# Patient Record
Sex: FEMALE | Race: WHITE | NOT HISPANIC OR LATINO | Employment: OTHER | URBAN - METROPOLITAN AREA
[De-identification: names, ages, dates, MRNs, and addresses within clinical notes are randomized per-mention and may not be internally consistent; named-entity substitution may affect disease eponyms.]

---

## 2017-01-03 ENCOUNTER — HOSPITAL ENCOUNTER (OUTPATIENT)
Dept: RADIOLOGY | Facility: HOSPITAL | Age: 62
Discharge: HOME | End: 2017-01-03
Attending: FAMILY MEDICINE
Payer: COMMERCIAL

## 2017-01-03 ENCOUNTER — TELEPHONE (OUTPATIENT)
Dept: FAMILY MEDICINE | Facility: CLINIC | Age: 62
End: 2017-01-03

## 2017-01-03 DIAGNOSIS — N64.4 BREAST PAIN: ICD-10-CM

## 2017-01-03 DIAGNOSIS — Z12.39 BREAST CANCER SCREENING: ICD-10-CM

## 2017-01-03 PROCEDURE — 77062 BREAST TOMOSYNTHESIS BI: CPT | Mod: 26,,, | Performed by: RADIOLOGY

## 2017-01-03 PROCEDURE — 77066 DX MAMMO INCL CAD BI: CPT | Mod: TC

## 2017-01-03 PROCEDURE — 76642 ULTRASOUND BREAST LIMITED: CPT | Mod: 26,LT,, | Performed by: RADIOLOGY

## 2017-01-03 PROCEDURE — 76642 ULTRASOUND BREAST LIMITED: CPT | Mod: TC,LT

## 2017-01-03 PROCEDURE — 77066 DX MAMMO INCL CAD BI: CPT | Mod: 26,,, | Performed by: RADIOLOGY

## 2017-01-25 ENCOUNTER — TELEPHONE (OUTPATIENT)
Dept: FAMILY MEDICINE | Facility: CLINIC | Age: 62
End: 2017-01-25

## 2017-01-25 DIAGNOSIS — N64.4 BREAST PAIN, LEFT: Primary | ICD-10-CM

## 2017-01-25 NOTE — TELEPHONE ENCOUNTER
----- Message from Miroslava Sims sent at 1/25/2017 11:05 AM CST -----  Contact: / self   Pt its requesting an appointment as soon as possible , states she is having pain on her breast . Please advise

## 2017-01-25 NOTE — TELEPHONE ENCOUNTER
Returned patients call. She stated she has been having breast pain. She had diagnostic testing done after new year but the pain is severe. Please advise

## 2017-01-25 NOTE — TELEPHONE ENCOUNTER
It looks like the pain has been on her left side, so I am ordering a breast MRI of the left breast-- there are very rare breast conditions that are best seen in MRI. If this is normal, however, then she should be seen in the offic eot discuss alternative diagnosis for her pain.  Here is the order for left breast MRI thanks

## 2017-01-26 ENCOUNTER — HOSPITAL ENCOUNTER (OUTPATIENT)
Dept: RADIOLOGY | Facility: HOSPITAL | Age: 62
Discharge: HOME | End: 2017-01-26
Attending: FAMILY MEDICINE
Payer: COMMERCIAL

## 2017-01-26 DIAGNOSIS — N64.4 BREAST PAIN, LEFT: ICD-10-CM

## 2017-01-26 LAB
CREAT SERPL-MCNC: 0.9 MG/DL (ref 0.5–1.4)
SAMPLE: NORMAL

## 2017-01-26 PROCEDURE — 0159T MRI BREAST BILATERAL: CPT | Mod: TC

## 2017-01-26 PROCEDURE — 77059 MRI BREAST BILATERAL: CPT | Mod: 26,,, | Performed by: RADIOLOGY

## 2017-01-26 PROCEDURE — A9577 INJ MULTIHANCE: HCPCS | Performed by: FAMILY MEDICINE

## 2017-01-26 PROCEDURE — 0159T MRI BREAST BILATERAL: CPT | Mod: 26,,, | Performed by: RADIOLOGY

## 2017-01-26 PROCEDURE — 25500020 PHARM REV CODE 255: Performed by: FAMILY MEDICINE

## 2017-01-26 RX ADMIN — GADOBENATE DIMEGLUMINE 20 ML: 529 INJECTION, SOLUTION INTRAVENOUS at 08:01

## 2017-01-27 ENCOUNTER — TELEPHONE (OUTPATIENT)
Dept: FAMILY MEDICINE | Facility: CLINIC | Age: 62
End: 2017-01-27

## 2017-01-27 NOTE — TELEPHONE ENCOUNTER
----- Message from Chichi Handy sent at 1/27/2017  3:57 PM CST -----  Contact: Self [phone]  TEST RESULTS:   Patient would like to get test results.  Name of test (lab, mammo, etc.): MRI  Date of test: 1/26/17

## 2017-01-30 ENCOUNTER — TELEPHONE (OUTPATIENT)
Dept: FAMILY MEDICINE | Facility: CLINIC | Age: 62
End: 2017-01-30

## 2017-01-30 NOTE — TELEPHONE ENCOUNTER
Returned patients call. Reviewed lab results. Patient verbalized understanding and scheduled a follow up

## 2017-01-30 NOTE — TELEPHONE ENCOUNTER
----- Message from Ivette Mitchell sent at 1/30/2017 11:38 AM CST -----  Contact: self, [phone]  Patient called in returning your call. Please advise.

## 2017-02-01 ENCOUNTER — OFFICE VISIT (OUTPATIENT)
Dept: FAMILY MEDICINE | Facility: CLINIC | Age: 62
End: 2017-02-01
Payer: COMMERCIAL

## 2017-02-01 VITALS
WEIGHT: 197.31 LBS | SYSTOLIC BLOOD PRESSURE: 136 MMHG | HEART RATE: 71 BPM | OXYGEN SATURATION: 98 % | HEIGHT: 67 IN | DIASTOLIC BLOOD PRESSURE: 90 MMHG | BODY MASS INDEX: 30.97 KG/M2

## 2017-02-01 DIAGNOSIS — E66.9 OBESITY (BMI 30.0-34.9): ICD-10-CM

## 2017-02-01 DIAGNOSIS — N64.4 BREAST PAIN, LEFT: Primary | ICD-10-CM

## 2017-02-01 DIAGNOSIS — R29.3 POOR POSTURE: ICD-10-CM

## 2017-02-01 PROCEDURE — 3075F SYST BP GE 130 - 139MM HG: CPT | Mod: S$GLB,,, | Performed by: FAMILY MEDICINE

## 2017-02-01 PROCEDURE — 99999 PR PBB SHADOW E&M-EST. PATIENT-LVL III: CPT | Mod: PBBFAC,,, | Performed by: FAMILY MEDICINE

## 2017-02-01 PROCEDURE — 3080F DIAST BP >= 90 MM HG: CPT | Mod: S$GLB,,, | Performed by: FAMILY MEDICINE

## 2017-02-01 PROCEDURE — 99213 OFFICE O/P EST LOW 20 MIN: CPT | Mod: S$GLB,,, | Performed by: FAMILY MEDICINE

## 2017-02-01 NOTE — PROGRESS NOTES
Office Visit    Patient Name: Zenia Canela    : 1955  MRN: [id number]    Subjective:  Zenia is a 61 y.o. female who presents today for:    Breast Pain    Here today to discuss left breast pain ongoing over the last few months.  Recently normal left breast ultrasound, mammogram and MRI. She reports that that pain radiates around from the left side of her chest around and into a portion of her breast.  No lesions, rashes, redness, warmth, or nipple discharge.  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 She underwent bariatric surgery several months ago and is doing very well with her weight loss-- feels that this has affected her overall body alignment.       Past Medical History  Past Medical History   Diagnosis Date    Benign essential hypertension 2012    Venous insufficiency 2012       Past Surgical History  Past Surgical History   Procedure Laterality Date    Hernia repair       section, low transverse      Cholecystectomy      Carpal tunnel release         Family History  Family History   Problem Relation Age of Onset    Stroke Father     Heart attack Father     COPD Mother     Cataracts Mother     Heart disease      Hyperlipidemia      Cataracts Sister     Amblyopia Neg Hx     Blindness Neg Hx     Glaucoma Neg Hx     Macular degeneration Neg Hx     Retinal detachment Neg Hx     Strabismus Neg Hx        Social History  Social History     Social History    Marital status:      Spouse name: N/A    Number of children: N/A    Years of education: N/A     Occupational History     [employer]     Social History Main Topics    Smoking status: Never Smoker    Smokeless tobacco: Not on file    Alcohol use No      Comment: Moderate    Drug use: No    Sexual activity: Not on file     Other Topics Concern    Not on file     Social History Narrative    Mom is Hansa Savage. She is a .  No  "smoking.         Current Medications  Medications reviewed and updated.     Allergies   Review of patient's allergies indicates:   Allergen Reactions    Phenytoin sodium extended      Other reaction(s): jittery       Review of Systems (Pertinent positives)  Review of Systems   Musculoskeletal: Positive for back pain (radiating around to the left chest area).   Skin: Negative for color change and rash.       Visit Vitals    BP (!) 136/90    Pulse 71    Ht 5' 7" (1.702 m)    Wt 89.5 kg (197 lb 5 oz)    SpO2 98%    BMI 30.9 kg/m2       Physical Exam   Constitutional: She is oriented to person, place, and time. She appears well-developed and well-nourished. No distress.   HENT:   Head: Normocephalic and atraumatic.   Eyes: Conjunctivae are normal.   Pulmonary/Chest: Effort normal. Right breast exhibits no mass. Left breast exhibits no mass.   Genitourinary: There is breast tenderness (of left costochondral region of upper/mid ribs and along the ribs along the breast). No breast discharge.   Musculoskeletal:        Back:    Neurological: She is alert and oriented to person, place, and time.   Skin:        Psychiatric: She has a normal mood and affect.   Vitals reviewed.        Assessment/Plan:  Zenia Canela is a 61 y.o. female who presents today for :    Zenia was seen today for breast pain.    Diagnoses and all orders for this visit:    Breast pain, left  Comments:  breast imaging all negative, suspected musculoskeletal origin related to postural issues of the thoracic spine/ ribs- will consult with chiropractor    Poor posture  Comments:  will establish with chiropractor for alignment and home exercises    Obesity (BMI 30.0-34.9)  Comments:  improving following bariatric surgery            ICD-10-CM ICD-9-CM    1. Breast pain, left N64.4 611.71     breast imaging all negative, suspected musculoskeletal origin related to postural issues of the thoracic spine/ ribs- will consult with chiropractor   2. Poor " posture R29.3 781.92     will establish with chiropractor for alignment and home exercises   3. Obesity (BMI 30.0-34.9) E66.9 278.00     improving following bariatric surgery       Return for return as needed for new concerns and for annual physical.

## 2017-09-18 ENCOUNTER — TELEPHONE (OUTPATIENT)
Dept: FAMILY MEDICINE | Facility: CLINIC | Age: 62
End: 2017-09-18

## 2017-09-18 NOTE — TELEPHONE ENCOUNTER
----- Message from Efe Moore LPN sent at 9/18/2017 12:33 PM CDT -----  Contact: Self [phone]      ----- Message -----  From: Chichi Handy  Sent: 9/18/2017   8:54 AM  To: Himanshu MENA Staff    Patient is calling to see if she can see the doctor this week for a tingling and itching underarm and her hand. Please advice

## 2017-09-18 NOTE — TELEPHONE ENCOUNTER
Returned patient's call, She stated she has been having tingling and itching in the right arm. She is now scheduled for 11/25. She also stated she had a mammo done in 01/17. It was not covered by the insurance because it was done as a 3D mammo. She stated the diagnostic center here changed it because they thought they seen something from the [place] she had done. She would like to know  If the coding can be modified to include the 3D so the insurance company will cover it. Please advise.

## 2017-09-18 NOTE — TELEPHONE ENCOUNTER
----- Message from Chichi Handy sent at 9/18/2017  8:54 AM CDT -----  Contact: Self [phone]  Patient is calling to see if she can see the doctor this week for a tingling and itching underarm and her hand. Please advice

## 2017-09-19 ENCOUNTER — TELEPHONE (OUTPATIENT)
Dept: FAMILY MEDICINE | Facility: CLINIC | Age: 62
End: 2017-09-19

## 2017-09-19 NOTE — TELEPHONE ENCOUNTER
Called patient to review recommendations: reviewed her breast imaging history, and unfortunately I do not see any way I can change the code that I use to order the screening mammogram.  The imaging center did not state in their documentation why the mammogram was changed to diagnostic, so I do not have anything to use to order the more advanced imaging.  I am sorry about this, but you can also direct her to billing department to see if anything else can be done.-- Left a voicemail requesting a call back.

## 2017-09-19 NOTE — TELEPHONE ENCOUNTER
Returned patients call and reviewed recommendations she verbalized understanding. She stated she will call the billing dept.

## 2017-09-19 NOTE — TELEPHONE ENCOUNTER
----- Message from Moses Mercado sent at 9/19/2017  3:40 PM CDT -----  Contact: Self/ [phone]  Patient is returning your call.  Please call and advise.

## 2017-09-19 NOTE — TELEPHONE ENCOUNTER
I reviewed her breast imaging history, and unfortunately I do not see any way I can change the code that I use to order the screening mammogram.  The imaging center did not state in their documentation why the mammogram was changed to diagnostic, so I do not have anything to use to order the more advanced imaging.  I am sorry about this, but you can also direct her to billing department to see if anything else can be done.  Thank you

## 2017-09-25 ENCOUNTER — OFFICE VISIT (OUTPATIENT)
Dept: FAMILY MEDICINE | Facility: CLINIC | Age: 62
End: 2017-09-25
Payer: COMMERCIAL

## 2017-09-25 VITALS
SYSTOLIC BLOOD PRESSURE: 128 MMHG | WEIGHT: 175.69 LBS | HEART RATE: 64 BPM | OXYGEN SATURATION: 99 % | BODY MASS INDEX: 27.57 KG/M2 | DIASTOLIC BLOOD PRESSURE: 75 MMHG | HEIGHT: 67 IN

## 2017-09-25 DIAGNOSIS — M77.11 RIGHT LATERAL EPICONDYLITIS: Primary | ICD-10-CM

## 2017-09-25 DIAGNOSIS — Z98.84 HISTORY OF BARIATRIC SURGERY: ICD-10-CM

## 2017-09-25 DIAGNOSIS — R68.89 FORGETFULNESS: ICD-10-CM

## 2017-09-25 DIAGNOSIS — Z12.31 ENCOUNTER FOR SCREENING MAMMOGRAM FOR BREAST CANCER: ICD-10-CM

## 2017-09-25 DIAGNOSIS — N95.1 VAGINAL DRYNESS, MENOPAUSAL: ICD-10-CM

## 2017-09-25 PROCEDURE — 3078F DIAST BP <80 MM HG: CPT | Mod: S$GLB,,, | Performed by: FAMILY MEDICINE

## 2017-09-25 PROCEDURE — 3008F BODY MASS INDEX DOCD: CPT | Mod: S$GLB,,, | Performed by: FAMILY MEDICINE

## 2017-09-25 PROCEDURE — 99214 OFFICE O/P EST MOD 30 MIN: CPT | Mod: S$GLB,,, | Performed by: FAMILY MEDICINE

## 2017-09-25 PROCEDURE — 3074F SYST BP LT 130 MM HG: CPT | Mod: S$GLB,,, | Performed by: FAMILY MEDICINE

## 2017-09-25 PROCEDURE — 99999 PR PBB SHADOW E&M-EST. PATIENT-LVL III: CPT | Mod: PBBFAC,,, | Performed by: FAMILY MEDICINE

## 2017-09-25 RX ORDER — CHOLECALCIFEROL (VITAMIN D3) 25 MCG
1000 TABLET ORAL DAILY
COMMUNITY
End: 2021-07-12 | Stop reason: ALTCHOICE

## 2017-09-25 RX ORDER — HYDROCHLOROTHIAZIDE 25 MG/1
25 TABLET ORAL DAILY
Refills: 3 | COMMUNITY
Start: 2017-08-12 | End: 2018-03-27 | Stop reason: SDUPTHER

## 2017-09-25 NOTE — PROGRESS NOTES
Office Visit    Patient Name: Zenia Canela    : 1955  MRN: [id number]    Subjective:  Zenia is a 62 y.o. female who presents today for:    Tingling (itching, tingling in right arm)    63 yo female with history of postural related musculoskeletal issues here today with itching and tingling of the right arm over the last 3 weeks-- tingling starting along the right outer elbow and extends into the forearm but not into the hand. No rash or skin abnormality noted. No loss of  strength. Upon further questioning she reports that over the last month she started a new exercise regimen that does involve several repetitive movements of the elbow and that the time course is starting with new exercises does seem to correlate with the new pain and tingling in her elbow and forearm.    Of note, she did have bariatric surgery in 2016 outside of the [facility] and she is continuing to lose weight consistently.  She feels well.  On review of systems she reports some mildly increased forgetfulness which she is not overly concerned about just has some general questions about when she needs to worry about Alzheimer's given her family history.  Finally, she requests a refill of her Premarin which she is stable on for treatment of vaginal dryness.    2017 normal breast MRI-- no malignancy    Past Medical History  Past Medical History:   Diagnosis Date    Benign essential hypertension 2012    Venous insufficiency 2012       Past Surgical History  Past Surgical History:   Procedure Laterality Date    CARPAL TUNNEL RELEASE       SECTION, LOW TRANSVERSE      CHOLECYSTECTOMY      HERNIA REPAIR         Family History  Family History   Problem Relation Age of Onset    Stroke Father     Heart attack Father     COPD Mother     Cataracts Mother     Heart disease      Hyperlipidemia      Cataracts Sister     Amblyopia Neg Hx     Blindness Neg Hx     Glaucoma Neg Hx     Macular  "degeneration Neg Hx     Retinal detachment Neg Hx     Strabismus Neg Hx        Social History  Social History     Social History    Marital status:      Spouse name: N/A    Number of children: N/A    Years of education: N/A     Occupational History     [employer]     Social History Main Topics    Smoking status: Never Smoker    Smokeless tobacco: Never Used    Alcohol use No      Comment: Moderate    Drug use: No    Sexual activity: Not on file     Other Topics Concern    Not on file     Social History Narrative    Mom is Hnasa Savage. She is a .  No smoking.         Current Medications  Medications reviewed and updated.     Allergies   Review of patient's allergies indicates:   Allergen Reactions    Phenytoin sodium extended      Other reaction(s): jittery       Review of Systems (Pertinent positives)  Review of Systems   Constitutional: Negative for unexpected weight change.   Genitourinary: Vaginal pain: vaginal dryness.   Skin: Negative for rash.   Neurological: Negative for weakness and numbness (positive tingling).   Psychiatric/Behavioral:        Increased forgetfulness         /75   Pulse 64   Ht 5' 7" (1.702 m)   Wt 79.7 kg (175 lb 11.3 oz)   SpO2 99%   BMI 27.52 kg/m²     Physical Exam   Constitutional: She is oriented to person, place, and time. She appears well-developed and well-nourished. No distress.   HENT:   Head: Normocephalic and atraumatic.   Eyes: Conjunctivae are normal.   Pulmonary/Chest: Effort normal.   Musculoskeletal: She exhibits no edema.        Right elbow: Tenderness found. Lateral epicondyle tenderness noted.   Neurological: She is alert and oriented to person, place, and time.   Skin: Skin is warm and dry.   Psychiatric: She has a normal mood and affect.   Vitals reviewed.        Assessment/Plan:  Zenia Canela is a 62 y.o. female who presents today for :    Zenia was seen today for tingling.    Diagnoses and all " orders for this visit:    Right lateral epicondylitis  Comments:  tennis elbow forearm strap for 6 weeks with activity, advil as needed for pain    Forgetfulness  Comments:  if having a negative effect on functioning then schedule appointment for memory testing    Encounter for screening mammogram for breast cancer  -     Mammo Digital Screening Bilat with CAD; Future    Vaginal dryness, menopausal  Comments:  stable on premarin  Orders:  -     conjugated estrogens (PREMARIN) vaginal cream; Place 0.5 g vaginally every evening. Decrease to 3-5x week once dryness improves    History of bariatric surgery  Comments:  Doing well, losing weight consistently, has labs and follow-up with bariatric surgeon arranged.            ICD-10-CM ICD-9-CM    1. Right lateral epicondylitis M77.11 726.32     tennis elbow forearm strap for 6 weeks with activity, advil as needed for pain   2. Forgetfulness R68.89 780.99     if having a negative effect on functioning then schedule appointment for memory testing   3. Encounter for screening mammogram for breast cancer Z12.31 V76.12 Mammo Digital Screening Bilat with CAD   4. Vaginal dryness, menopausal N95.1 627.2 conjugated estrogens (PREMARIN) vaginal cream    stable on premarin   5. History of bariatric surgery Z98.84 V45.86     Doing well, losing weight consistently, has labs and follow-up with bariatric surgeon arranged.       Patient Instructions   Zenia was seen today for tingling.    Diagnoses and all orders for this visit:    Right lateral epicondylitis  Comments:  tennis elbow forearm strap for 6 weeks with activity, advil as needed for pain    Forgetfulness  Comments:  if having a negative effect on functioning then schedule appointment for memory testing    Encounter for screening mammogram for breast cancer  -     Mammo Digital Screening Bilat with CAD; Future    Vaginal dryness, menopausal  -     conjugated estrogens (PREMARIN) vaginal cream; Place 0.5 g vaginally every  evening. Decrease to 3-5x week once dryness improves          Return for return august 2018 for annual exam, sooner if concerns.

## 2017-09-25 NOTE — PATIENT INSTRUCTIONS
Zenia was seen today for tingling.    Diagnoses and all orders for this visit:    Right lateral epicondylitis  Comments:  tennis elbow forearm strap for 6 weeks with activity, advil as needed for pain    Forgetfulness  Comments:  if having a negative effect on functioning then schedule appointment for memory testing    Encounter for screening mammogram for breast cancer  -     Mammo Digital Screening Bilat with CAD; Future    Vaginal dryness, menopausal  -     conjugated estrogens (PREMARIN) vaginal cream; Place 0.5 g vaginally every evening. Decrease to 3-5x week once dryness improves

## 2017-11-16 ENCOUNTER — TELEPHONE (OUTPATIENT)
Dept: FAMILY MEDICINE | Facility: CLINIC | Age: 62
End: 2017-11-16

## 2017-11-16 NOTE — TELEPHONE ENCOUNTER
----- Message from Christina Sykes sent at 11/15/2017 12:48 PM CST -----  Contact: [phone]/self  Pt need to be seen before 11/27/2017 for a medical clearance.  Please call and advise

## 2017-11-20 ENCOUNTER — TELEPHONE (OUTPATIENT)
Dept: FAMILY MEDICINE | Facility: CLINIC | Age: 62
End: 2017-11-20

## 2017-11-20 NOTE — TELEPHONE ENCOUNTER
Spoke with patient. She was originally scheduled on 11/27 for pre op clearance and had to reschedule due to incorrect scheduling. She is now scheduled for a sooner date.

## 2017-11-28 ENCOUNTER — OFFICE VISIT (OUTPATIENT)
Dept: FAMILY MEDICINE | Facility: CLINIC | Age: 62
End: 2017-11-28
Payer: COMMERCIAL

## 2017-11-28 ENCOUNTER — HOSPITAL ENCOUNTER (OUTPATIENT)
Dept: CARDIOLOGY | Facility: HOSPITAL | Age: 62
Discharge: HOME | End: 2017-11-28
Attending: FAMILY MEDICINE
Payer: COMMERCIAL

## 2017-11-28 VITALS
HEART RATE: 65 BPM | DIASTOLIC BLOOD PRESSURE: 85 MMHG | BODY MASS INDEX: 27.57 KG/M2 | WEIGHT: 175.69 LBS | HEIGHT: 67 IN | SYSTOLIC BLOOD PRESSURE: 135 MMHG | OXYGEN SATURATION: 98 %

## 2017-11-28 DIAGNOSIS — I10 BENIGN ESSENTIAL HYPERTENSION: ICD-10-CM

## 2017-11-28 DIAGNOSIS — Z98.84 HISTORY OF BARIATRIC SURGERY: ICD-10-CM

## 2017-11-28 DIAGNOSIS — E55.9 VITAMIN D DEFICIENCY: ICD-10-CM

## 2017-11-28 DIAGNOSIS — M25.571 CHRONIC PAIN OF RIGHT ANKLE: Primary | ICD-10-CM

## 2017-11-28 DIAGNOSIS — M25.371 ANKLE INSTABILITY, RIGHT: ICD-10-CM

## 2017-11-28 DIAGNOSIS — Z01.810 PREOP CARDIOVASCULAR EXAM: ICD-10-CM

## 2017-11-28 DIAGNOSIS — M70.51 PES ANSERINUS BURSITIS OF RIGHT KNEE: ICD-10-CM

## 2017-11-28 DIAGNOSIS — E66.3 OVERWEIGHT (BMI 25.0-29.9): ICD-10-CM

## 2017-11-28 DIAGNOSIS — Z79.1 LONG TERM (CURRENT) USE OF NON-STEROIDAL ANTI-INFLAMMATORIES (NSAID): ICD-10-CM

## 2017-11-28 DIAGNOSIS — G89.29 CHRONIC PAIN OF RIGHT ANKLE: Primary | ICD-10-CM

## 2017-11-28 PROCEDURE — 93005 ELECTROCARDIOGRAM TRACING: CPT

## 2017-11-28 PROCEDURE — 99214 OFFICE O/P EST MOD 30 MIN: CPT | Mod: S$GLB,,, | Performed by: FAMILY MEDICINE

## 2017-11-28 PROCEDURE — 99999 PR PBB SHADOW E&M-EST. PATIENT-LVL III: CPT | Mod: PBBFAC,,, | Performed by: FAMILY MEDICINE

## 2017-11-28 PROCEDURE — 93010 ELECTROCARDIOGRAM REPORT: CPT | Mod: ,,, | Performed by: INTERNAL MEDICINE

## 2017-11-28 RX ORDER — HYDROCODONE BITARTRATE AND ACETAMINOPHEN 5; 325 MG/1; MG/1
1 TABLET ORAL EVERY 6 HOURS PRN
Qty: 30 TABLET | Refills: 0 | Status: SHIPPED | OUTPATIENT
Start: 2017-11-28 | End: 2018-03-27

## 2017-11-28 NOTE — PROGRESS NOTES
Office Visit    Patient Name: Zenia Canela    : 1955  MRN: [id number]    Subjective:  Zenia is a 62 y.o. female who presents today for:    Pre-op Exam (right ankle surgery) and Knee Pain (right knee)    Here for pre-op clearance for right ankle surgery by Ortho Dr Cain with [facility], scheduled on 2017.  Surgery is scheduled secondary to chronic pain and instability of the right ankle.     She is overall very healthy and on just HCTZ 25 mg daily for mild HTN and leg swelling. Had gastric sleeve 2016 and no complications-- no issues with anesthesia, etc.     Feeligng healthy and well with no symptoms of infection- no fevers, chills, nausea, vomiting, dysuria, diarrhea, skin rash, sore throat    Exercise tolerance is excellent and she can do an hour of brisk cardiovascular thighs without any cardiopulmonary symptoms.  He does not have chest pain, shortness of breath, dizziness or lightheadedness, palpitations    She does intermittently take NSAIDs such as Aleve for the pain in her right ankle and right knee.  She is aware of the need to stop this 7-10 days prior to her surgery along with any aspirin containing products.    Past Medical History  Past Medical History:   Diagnosis Date    Benign essential hypertension 2012    Venous insufficiency 2012       Past Surgical History  Past Surgical History:   Procedure Laterality Date    CARPAL TUNNEL RELEASE       SECTION, LOW TRANSVERSE      CHOLECYSTECTOMY      HERNIA REPAIR         Family History  Family History   Problem Relation Age of Onset    Stroke Father     Heart attack Father     COPD Mother     Cataracts Mother     Heart disease      Hyperlipidemia      Cataracts Sister     Amblyopia Neg Hx     Blindness Neg Hx     Glaucoma Neg Hx     Macular degeneration Neg Hx     Retinal detachment Neg Hx     Strabismus Neg Hx        Social History  Social History     Social History    Marital  "status:      Spouse name: N/A    Number of children: N/A    Years of education: N/A     Occupational History     [employer]     Social History Main Topics    Smoking status: Never Smoker    Smokeless tobacco: Never Used    Alcohol use No      Comment: Moderate    Drug use: No    Sexual activity: Not on file     Other Topics Concern    Not on file     Social History Narrative    Mom is Hansa Savage. She is a .  No smoking.         Current Medications  Medications reviewed and updated.     Allergies   Review of patient's allergies indicates:   Allergen Reactions    Phenytoin sodium extended      Other reaction(s): jittery       Review of Systems (Pertinent positives)  Review of Systems   Constitutional: Negative for chills, fever and unexpected weight change.   HENT: Negative for sore throat.    Eyes: Negative for visual disturbance.   Respiratory: Negative for chest tightness and shortness of breath.    Cardiovascular: Negative for chest pain, palpitations and leg swelling.   Gastrointestinal: Negative for diarrhea, nausea and vomiting.   Genitourinary: Negative for dysuria.   Musculoskeletal: Positive for arthralgias.   Skin: Negative for rash.   Neurological: Negative for dizziness, light-headedness and headaches.       /85 (BP Location: Right arm, Patient Position: Sitting)   Pulse 65   Ht 5' 7" (1.702 m)   Wt 79.7 kg (175 lb 11.3 oz)   SpO2 98%   BMI 27.52 kg/m²     Physical Exam   Constitutional: She is oriented to person, place, and time. She appears well-developed and well-nourished. No distress.   HENT:   Head: Normocephalic and atraumatic.   Mouth/Throat: Oropharynx is clear and moist. No oropharyngeal exudate.   Eyes: Conjunctivae are normal.   Neck: Normal range of motion. Neck supple.   Cardiovascular: Normal rate and regular rhythm.    Pulmonary/Chest: Effort normal and breath sounds normal.   Musculoskeletal: She exhibits no edema.        " Right knee: She exhibits no swelling and no effusion. Tenderness (over anserine bursa) found. Medial joint line tenderness noted.   Lymphadenopathy:     She has no cervical adenopathy.   Neurological: She is alert and oriented to person, place, and time.   Skin: Skin is warm and dry.   Psychiatric: She has a normal mood and affect.   Vitals reviewed.        Assessment/Plan:  Zenia Canela is a 62 y.o. female who presents today for :    Zenia was seen today for pre-op exam and knee pain.    Diagnoses and all orders for this visit:    Chronic pain of right ankle  Comments:  has surgery scheduled as per [facility]  Orders:  -     hydrocodone-acetaminophen 5-325mg (NORCO) 5-325 mg per tablet; Take 1 tablet by mouth every 6 (six) hours as needed for Pain.    Ankle instability, right  -     hydrocodone-acetaminophen 5-325mg (NORCO) 5-325 mg per tablet; Take 1 tablet by mouth every 6 (six) hours as needed for Pain.    Preop cardiovascular exam  Comments:  clear for surgery pending results of EKG and blood work  Orders:  -     Comprehensive metabolic panel; Future  -     CBC auto differential; Future  -     EKG 12-lead; Future    Benign essential hypertension  -     Comprehensive metabolic panel; Future  -     CBC auto differential; Future  -     EKG 12-lead; Future    Overweight (BMI 25.0-29.9)    History of bariatric surgery    Vitamin D deficiency  Comments:  [facility]    Pes anserinus bursitis of right knee  Comments:  will try ice, stretches, brace as needed  Orders:  -     hydrocodone-acetaminophen 5-325mg (NORCO) 5-325 mg per tablet; Take 1 tablet by mouth every 6 (six) hours as needed for Pain.    Long term (current) use of non-steroidal anti-inflammatories (nsaid)  Comments:  reminded to stop any aspirin containing products and NSAIDs 7-10 days prior to her surgery            ICD-10-CM ICD-9-CM    1. Chronic pain of right ankle M25.571 719.47 hydrocodone-acetaminophen 5-325mg (NORCO)  5-325 mg per tablet    G89.29 338.29     has surgery scheduled as per HPI   2. Ankle instability, right M25.371 718.87 hydrocodone-acetaminophen 5-325mg (NORCO) 5-325 mg per tablet   3. Preop cardiovascular exam Z01.810 V72.81 Comprehensive metabolic panel      CBC auto differential      EKG 12-lead    clear for surgery pending results of EKG and blood work   4. Benign essential hypertension I10 401.1 Comprehensive metabolic panel      CBC auto differential      EKG 12-lead   5. Overweight (BMI 25.0-29.9) E66.3 278.02    6. History of bariatric surgery Z98.84 V45.86    7. Vitamin D deficiency E55.9 268.9     [facility] and [facility]   8. Pes anserinus bursitis of right knee M70.51 726.61 hydrocodone-acetaminophen 5-325mg (NORCO) 5-325 mg per tablet    will try ice, stretches, brace as needed   9. Long term (current) use of non-steroidal anti-inflammatories (nsaid) Z79.1 V58.64     reminded to stop any aspirin containing products and NSAIDs 7-10 days prior to her surgery       Patient Instructions   Will fax [facility] clearance to [facility] pending results of EKG and blood work.        Return for return as needed for new concerns.

## 2017-11-30 ENCOUNTER — TELEPHONE (OUTPATIENT)
Dept: FAMILY MEDICINE | Facility: CLINIC | Age: 62
End: 2017-11-30

## 2018-02-27 ENCOUNTER — HOSPITAL ENCOUNTER (OUTPATIENT)
Dept: RADIOLOGY | Facility: HOSPITAL | Age: 63
Discharge: HOME | End: 2018-02-27
Attending: FAMILY MEDICINE
Payer: COMMERCIAL

## 2018-02-27 DIAGNOSIS — Z12.31 ENCOUNTER FOR SCREENING MAMMOGRAM FOR BREAST CANCER: ICD-10-CM

## 2018-02-27 PROCEDURE — 77063 BREAST TOMOSYNTHESIS BI: CPT | Mod: 26,,, | Performed by: RADIOLOGY

## 2018-02-27 PROCEDURE — 77067 SCR MAMMO BI INCL CAD: CPT | Mod: 26,,, | Performed by: RADIOLOGY

## 2018-02-27 PROCEDURE — 77067 SCR MAMMO BI INCL CAD: CPT | Mod: TC

## 2018-03-26 ENCOUNTER — TELEPHONE (OUTPATIENT)
Dept: FAMILY MEDICINE | Facility: CLINIC | Age: 63
End: 2018-03-26

## 2018-03-26 NOTE — TELEPHONE ENCOUNTER
----- Message from Janett Jarrell sent at 3/23/2018  2:41 PM CDT -----  Contact: [phone]  Patient would like to be seen sooner than the next available appointment. She is experiencing dizziness and it's getting worse. Please advise.

## 2018-03-26 NOTE — TELEPHONE ENCOUNTER
Returned patient's call, She said she has been experiencing dizziness and she would like to be seen this week. She is now scheduled for tomorrow.

## 2018-03-27 ENCOUNTER — OFFICE VISIT (OUTPATIENT)
Dept: FAMILY MEDICINE | Facility: CLINIC | Age: 63
End: 2018-03-27
Payer: COMMERCIAL

## 2018-03-27 VITALS
HEIGHT: 68 IN | DIASTOLIC BLOOD PRESSURE: 78 MMHG | WEIGHT: 177.5 LBS | SYSTOLIC BLOOD PRESSURE: 136 MMHG | TEMPERATURE: 99 F | OXYGEN SATURATION: 98 % | HEART RATE: 66 BPM | BODY MASS INDEX: 26.9 KG/M2

## 2018-03-27 DIAGNOSIS — E66.3 OVERWEIGHT (BMI 25.0-29.9): ICD-10-CM

## 2018-03-27 DIAGNOSIS — I10 BENIGN ESSENTIAL HYPERTENSION: ICD-10-CM

## 2018-03-27 DIAGNOSIS — R42 DIZZINESS: Primary | ICD-10-CM

## 2018-03-27 PROCEDURE — 3078F DIAST BP <80 MM HG: CPT | Mod: CPTII,S$GLB,, | Performed by: FAMILY MEDICINE

## 2018-03-27 PROCEDURE — 99214 OFFICE O/P EST MOD 30 MIN: CPT | Mod: S$GLB,,, | Performed by: FAMILY MEDICINE

## 2018-03-27 PROCEDURE — 99999 PR PBB SHADOW E&M-EST. PATIENT-LVL III: CPT | Mod: PBBFAC,,, | Performed by: FAMILY MEDICINE

## 2018-03-27 PROCEDURE — 3075F SYST BP GE 130 - 139MM HG: CPT | Mod: CPTII,S$GLB,, | Performed by: FAMILY MEDICINE

## 2018-03-27 RX ORDER — HYDROCHLOROTHIAZIDE 25 MG/1
25 TABLET ORAL DAILY
Qty: 90 TABLET | Refills: 3 | Status: SHIPPED | OUTPATIENT
Start: 2018-03-27 | End: 2019-06-04 | Stop reason: SDUPTHER

## 2018-03-27 NOTE — PROGRESS NOTES
" Office Visit    Patient Name: Zenia Canela    : 1955  MRN: [id number]    Subjective:  Zenia is a 62 y.o. female who presents today for:    Dizziness (refill hydrochlorothiazide, dizziness ongoing for 1 week it stopped yesterday)    61 yo with PMH pertinent for HTN, low vitamin D and s/p bariatric surgery with significant weight loss here today to discuss dizziness.    starting about 1 week ago when she woke up in the morning she felt woozy, and like she was on a "cruise ship." no overt vertigo/ spinning.  One episode of ear ringing-- very brief and hearing has been normal.  She reports no lightheadedness, no fevers chills nausea or vomiting. no associated URI symptoms.  No chest pain, palpitations, increased leg swelling.  Blood pressures have been stable on hydrochlorothiazide.    EKG 2017 for pre-op clearance for foot surgery was normal. Labs including CBC/ CMP unremarkable at that time      Past Medical History  Past Medical History:   Diagnosis Date    Benign essential hypertension 2012    Venous insufficiency 2012       Past Surgical History  Past Surgical History:   Procedure Laterality Date    CARPAL TUNNEL RELEASE       SECTION, LOW TRANSVERSE      CHOLECYSTECTOMY      HERNIA REPAIR         Family History  Family History   Problem Relation Age of Onset    Stroke Father     Heart attack Father     COPD Mother     Cataracts Mother     Heart disease      Hyperlipidemia      Cataracts Sister     Amblyopia Neg Hx     Blindness Neg Hx     Glaucoma Neg Hx     Macular degeneration Neg Hx     Retinal detachment Neg Hx     Strabismus Neg Hx        Social History  Social History     Social History    Marital status:      Spouse name: N/A    Number of children: N/A    Years of education: N/A     Occupational History      [employer]     Social History Main Topics    Smoking status: Never Smoker    Smokeless tobacco: Never Used    Alcohol use " "No      Comment: Moderate    Drug use: No    Sexual activity: Not on file     Other Topics Concern    Not on file     Social History Narrative    Mom is Hansa Savage. She is a .  No smoking.         Current Medications  Medications reviewed and updated.     Allergies   Review of patient's allergies indicates:   Allergen Reactions    Phenytoin sodium extended      Other reaction(s): jittery       Review of Systems (Pertinent positives)  Review of Systems   Constitutional: Negative for chills, fever and unexpected weight change.   HENT: Negative for ear pain, sinus pain and sinus pressure.    Eyes: Negative for visual disturbance.   Respiratory: Negative for chest tightness and shortness of breath.    Cardiovascular: Negative for chest pain, palpitations and leg swelling.   Gastrointestinal: Negative for nausea and vomiting.   Neurological: Negative for dizziness (now resolved), light-headedness and headaches.       /78   Pulse 66   Temp 98.9 °F (37.2 °C)   Ht 5' 7.5" (1.715 m)   Wt 80.5 kg (177 lb 7.5 oz)   LMP  (LMP Unknown)   SpO2 98%   BMI 27.39 kg/m²     Physical Exam   Constitutional: She is oriented to person, place, and time. She appears well-developed and well-nourished. No distress.   HENT:   Head: Normocephalic and atraumatic.   Right Ear: Tympanic membrane normal.   Left Ear: Tympanic membrane is retracted. A middle ear effusion (small) is present.   Nose: No mucosal edema or rhinorrhea.   Mouth/Throat: Posterior oropharyngeal erythema (mild) present. No oropharyngeal exudate or posterior oropharyngeal edema.   Eyes: Conjunctivae are normal.   Cardiovascular: Normal rate and regular rhythm.    Pulmonary/Chest: Effort normal and breath sounds normal.   Musculoskeletal: She exhibits no edema.   Neurological: She is alert and oriented to person, place, and time. Coordination normal.   Negative Klarissa-Hallpike   Skin: Skin is warm and dry.   Psychiatric: She has a normal mood " and affect.   Vitals reviewed.        Assessment/Plan:  Zenia Canela is a 62 y.o. female who presents today for :    Zenia was seen today for dizziness.    Diagnoses and all orders for this visit:    Dizziness  Comments:  suspect viral labrynthitis and symptoms now resolved.  no associated symptoms/ indication for labs or imaging/ further work up. will monitor    Benign essential hypertension  Comments:  controlled  Orders:  -     hydroCHLOROthiazide (HYDRODIURIL) 25 MG tablet; Take 1 tablet (25 mg total) by mouth once daily.    Overweight (BMI 25.0-29.9)            ICD-10-CM ICD-9-CM    1. Dizziness R42 780.4     suspect viral labrynthitis and symptoms now resolved.  no associated symptoms/ indication for labs or imaging/ further work up. will monitor   2. Benign essential hypertension I10 401.1 hydroCHLOROthiazide (HYDRODIURIL) 25 MG tablet    controlled   3. Overweight (BMI 25.0-29.9) E66.3 278.02        There are no Patient Instructions on file for this visit.      Follow-up for return as needed for new concerns and for annual exam later this year.

## 2018-10-26 ENCOUNTER — TELEPHONE (OUTPATIENT)
Dept: FAMILY MEDICINE | Facility: CLINIC | Age: 63
End: 2018-10-26

## 2018-10-26 NOTE — TELEPHONE ENCOUNTER
Called patient to schedule appointment for Tdap injection.  Scheduled patient for this upcoming Monday.  Patient verbalized understanding.

## 2018-10-29 ENCOUNTER — TELEPHONE (OUTPATIENT)
Dept: FAMILY MEDICINE | Facility: CLINIC | Age: 63
End: 2018-10-29

## 2018-10-29 ENCOUNTER — CLINICAL SUPPORT (OUTPATIENT)
Dept: FAMILY MEDICINE | Facility: CLINIC | Age: 63
End: 2018-10-29
Payer: COMMERCIAL

## 2018-10-29 PROCEDURE — 90686 IIV4 VACC NO PRSV 0.5 ML IM: CPT | Mod: S$GLB,,, | Performed by: FAMILY MEDICINE

## 2018-10-29 PROCEDURE — 90715 TDAP VACCINE 7 YRS/> IM: CPT | Mod: S$GLB,,, | Performed by: FAMILY MEDICINE

## 2018-10-29 PROCEDURE — 90471 IMMUNIZATION ADMIN: CPT | Mod: S$GLB,,, | Performed by: FAMILY MEDICINE

## 2018-10-29 PROCEDURE — 90472 IMMUNIZATION ADMIN EACH ADD: CPT | Mod: S$GLB,,, | Performed by: FAMILY MEDICINE

## 2018-10-29 NOTE — TELEPHONE ENCOUNTER
Patient came in office today for nurses visit.  States that she had left hand pain that has been occurring over the past 3 weeks persistently.  Patient states that she feels a small hard nodule forming in palm of hand near ring finger where pain is located.  Requesting xray.  Please advise.

## 2018-10-30 NOTE — TELEPHONE ENCOUNTER
In some ways this sounds like it may be a tendon nodule, like what we seen in trigger finger. In any case, several types of nodular lesions don't show up on xray, so please have her schedule an appoint with me so that I can evaluate her condition and determine if xray is appropriate thanks

## 2018-10-31 ENCOUNTER — TELEPHONE (OUTPATIENT)
Dept: FAMILY MEDICINE | Facility: CLINIC | Age: 63
End: 2018-10-31

## 2018-10-31 NOTE — TELEPHONE ENCOUNTER
Returned patient's call to notify that she will need to come in for office visit.  Patient verbalized understanding.

## 2019-06-03 RX ORDER — HYDROCHLOROTHIAZIDE 25 MG/1
25 TABLET ORAL DAILY
Qty: 90 TABLET | Refills: 4 | Status: CANCELLED | OUTPATIENT
Start: 2019-06-03

## 2019-06-03 NOTE — PROGRESS NOTES
Office Visit    Patient Name: Zenia Canela    : 1955  MRN: [id number]    Subjective:  Zenia is a 63 y.o. female who presents today for:    Annual Exam (shingles, sore on nose)    Zenia Canela presents today for annual wellness exam and monitoring of chronic conditions that include  HTN controlled on HCTZ 25 mg daily, ISABELLA stable with use of premarin vaginal & s/p wt loss, overweight BMI, vit D deficiency.     She is is postmenopausal. Most recent PAP WNL/HPV (-) 10/14/15     They have been feeling overall well. Preparing for right knee replacement- [facility].     General lifestyle habits are as follows:  Diet is described as healthy-- good variety, exercise is described as decreased due to right knee pain (planning to have knee replacement), sleep is described as fair. Weight is up about 10 lbs in the last year but still down 60 from prior to her gastric bypass.     Immunizations: TDaP 10/29/18 & yearly influenza UTD, SHINGRIX- due and advised through pharmacy, pneumonia vaccines after age 65    Screening Tests: DEXA 12 WNL & repeat at age 64, mammogram 17- repeat 1 yr and ordered, Colonoscopy 13- repeat 10 yrs (hyperplastic polyps), PAP/HPV WNL/(-) 10/14/15- repeat 5 yrs, HEP C (-) 2004    Eye/Dental: Eye UTD- no concerns , Dental UTD- appointment today            Past Medical History  Past Medical History:   Diagnosis Date    Benign essential hypertension 2012    Venous insufficiency 2012       Past Surgical History  Past Surgical History:   Procedure Laterality Date    BARIATRIC SURGERY      gastric sleeve    CARPAL TUNNEL RELEASE       SECTION, LOW TRANSVERSE      CHOLECYSTECTOMY      HERNIA REPAIR         Family History  Family History   Problem Relation Age of Onset    Stroke Father     Heart attack Father     COPD Mother     Cataracts Mother     Heart disease Unknown     Hyperlipidemia Unknown     Cataracts Sister     Amblyopia  Neg Hx     Blindness Neg Hx     Glaucoma Neg Hx     Macular degeneration Neg Hx     Retinal detachment Neg Hx     Strabismus Neg Hx        Social History  Social History     Socioeconomic History    Marital status:      Spouse name: Not on file    Number of children: Not on file    Years of education: Not on file    Highest education level: Not on file   Occupational History     Employer: [employer]   Social Needs    Financial resource strain: Not on file    Food insecurity:     Worry: Not on file     Inability: Not on file    Transportation needs:     Medical: Not on file     Non-medical: Not on file   Tobacco Use    Smoking status: Never Smoker    Smokeless tobacco: Never Used   Substance and Sexual Activity    Alcohol use: No     Comment: Moderate    Drug use: No    Sexual activity: Not on file   Lifestyle    Physical activity:     Days per week: Not on file     Minutes per session: Not on file    Stress: Not on file   Relationships    Social connections:     Talks on phone: Not on file     Gets together: Not on file     Attends Taoism service: Not on file     Active member of club or organization: Not on file     Attends meetings of clubs or organizations: Not on file     Relationship status: Not on file   Other Topics Concern    Not on file   Social History Narrative    Mom is Hansa Savage. She is a .  No smoking.         Current Medications  Medications reviewed and updated.     Allergies   Review of patient's allergies indicates:   Allergen Reactions    Phenytoin sodium extended      Other reaction(s): jittery       Review of Systems (Pertinent positives)  Review of Systems   Constitutional: Positive for unexpected weight change. Negative for chills and fever.   HENT: Negative for dental problem.    Eyes: Negative for visual disturbance.   Respiratory: Negative for chest tightness and shortness of breath.    Cardiovascular: Negative for chest  "pain and leg swelling.   Gastrointestinal: Negative for constipation, diarrhea, nausea and vomiting.   Genitourinary: Negative for dysuria and hematuria.   Musculoskeletal: Positive for arthralgias.   Skin: Negative for rash.   Allergic/Immunologic: Negative for environmental allergies.   Neurological: Negative for dizziness and light-headedness.   Hematological: Bruises/bleeds easily.       /86   Pulse 61   Temp 98 °F (36.7 °C)   Ht 5' 7" (1.702 m)   Wt 83.7 kg (184 lb 8.4 oz)   LMP  (LMP Unknown)   SpO2 99%   BMI 28.90 kg/m²     Physical Exam   Constitutional: She is oriented to person, place, and time. She appears well-developed and well-nourished. No distress.   HENT:   Head: Normocephalic and atraumatic.   Right Ear: Ear canal normal. Tympanic membrane is not erythematous and not bulging.   Left Ear: Ear canal normal. Tympanic membrane is not erythematous and not bulging.   Mouth/Throat: No oropharyngeal exudate.   Eyes: Conjunctivae are normal.   Neck: Carotid bruit is not present. No thyroid mass and no thyromegaly present.   Cardiovascular: Normal rate, regular rhythm and normal heart sounds.   No murmur heard.  Pulses:       Dorsalis pedis pulses are 2+ on the right side, and 2+ on the left side.   Pulmonary/Chest: Effort normal and breath sounds normal. No respiratory distress.   Abdominal: Soft. Bowel sounds are normal. She exhibits no distension and no mass. There is no hepatosplenomegaly. There is no tenderness.   Musculoskeletal: Normal range of motion.   Lymphadenopathy:     She has no cervical adenopathy.   Neurological: She is alert and oriented to person, place, and time.   Skin: Skin is warm and dry. No rash noted.   Psychiatric: She has a normal mood and affect.   Vitals reviewed.        Assessment/Plan:  Zenia Canela is a 63 y.o. female who presents today for :    Zenia was seen today for annual exam.    Diagnoses and all orders for this visit:    Routine general medical " examination at a health care facility  Comments:  HEALTH MAINTENANCE REVIEWED:LABS/MAMMO PENDING & SHINGRIX ADVISED THRU PHARMACY OTW UP TO DATE. ADVISED ON DIET/EXERCISE/SLEEP & EYE/DENTAL EXAMS  Orders:  -     Hemoglobin A1c; Future  -     Comprehensive metabolic panel; Future  -     Lipid panel; Future  -     CBC auto differential; Future  -     TSH; Future  -     Vitamin D; Future  -     Mammo Digital Screening Bilat; Future  -     EKG 12-lead; Future    Overweight (BMI 25.0-29.9)    Vitamin D deficiency  Comments:  check level on 1,000 IU daily supplement, CHECK BONE DENSITY NEXT YEAR AS PREVIOUSLY NORMAL   Orders:  -     Vitamin D; Future    Primary osteoarthritis of right knee  Comments:  knee replacement surgery pending    History of bariatric surgery    SI (stress incontinence), female  Comments:  stable s/p wt loss and no longer needing premarin vaginal, no UTI symptoms    Benign essential hypertension  Comments:  controlled on HCTZ 25 mg daily  Orders:  -     Hemoglobin A1c; Future  -     Comprehensive metabolic panel; Future  -     Lipid panel; Future  -     CBC auto differential; Future  -     TSH; Future  -     hydroCHLOROthiazide (HYDRODIURIL) 25 MG tablet; Take 1 tablet (25 mg total) by mouth once daily.  -     EKG 12-lead; Future    Medication management  -     Hemoglobin A1c; Future  -     Comprehensive metabolic panel; Future  -     Lipid panel; Future  -     CBC auto differential; Future  -     TSH; Future  -     Vitamin D; Future    Venous insufficiency  Comments:  stable on HCTZ and s/p weight loss    Encounter for screening mammogram for breast cancer  -     Mammo Digital Screening Bilat; Future    Need for shingles vaccine    Nasal sore  Comments:  Some ongoing erythematous crusting inside right nostril, trial of mupirocin nasal ointment  Orders:  -     mupirocin calcium 2% nasl oint (BACTROBAN) 2 % Oint; by Nasal route 2 (two) times daily. for 5 days    Other orders  -     Cancel:  hydroCHLOROthiazide (HYDRODIURIL) 25 MG tablet; Take 1 tablet (25 mg total) by mouth once daily.            ICD-10-CM ICD-9-CM    1. Routine general medical examination at a health care facility Z00.00 V70.0 Hemoglobin A1c      Comprehensive metabolic panel      Lipid panel      CBC auto differential      TSH      Vitamin D      Mammo Digital Screening Bilat      EKG 12-lead    HEALTH MAINTENANCE REVIEWED:LABS/MAMMO PENDING & SHINGRIX ADVISED THRU PHARMACY OTW UP TO DATE. ADVISED ON DIET/EXERCISE/SLEEP & EYE/DENTAL EXAMS   2. Overweight (BMI 25.0-29.9) E66.3 278.02    3. Vitamin D deficiency E55.9 268.9 Vitamin D    check level on 1,000 IU daily supplement, CHECK BONE DENSITY NEXT YEAR AS PREVIOUSLY NORMAL    4. Primary osteoarthritis of right knee M17.11 715.16     knee replacement surgery pending   5. History of bariatric surgery Z98.84 V45.86    6. SI (stress incontinence), female N39.3 625.6     stable s/p wt loss and no longer needing premarin vaginal, no UTI symptoms   7. Benign essential hypertension I10 401.1 Hemoglobin A1c      Comprehensive metabolic panel      Lipid panel      CBC auto differential      TSH      hydroCHLOROthiazide (HYDRODIURIL) 25 MG tablet      EKG 12-lead    controlled on HCTZ 25 mg daily   8. Medication management Z79.899 V58.69 Hemoglobin A1c      Comprehensive metabolic panel      Lipid panel      CBC auto differential      TSH      Vitamin D   9. Venous insufficiency I87.2 459.81     stable on HCTZ and s/p weight loss   10. Encounter for screening mammogram for breast cancer Z12.31 V76.12 Mammo Digital Screening Bilat   11. Need for shingles vaccine Z23 V04.89    12. Nasal sore J34.89 478.19 mupirocin calcium 2% nasl oint (BACTROBAN) 2 % Oint    Some ongoing erythematous crusting inside right nostril, trial of mupirocin nasal ointment       Patient Instructions   HEALTH MAINTENANCE REVIEWED: LABS/MAMMO PENDING & *SHINGRIX ADVISED THRU PHARMACY* OTW UP TO DATE.           Follow up  in about 1 year (around 6/4/2020) for to follow up on lab results, return as needed for new concerns.

## 2019-06-04 ENCOUNTER — TELEPHONE (OUTPATIENT)
Dept: FAMILY MEDICINE | Facility: CLINIC | Age: 64
End: 2019-06-04

## 2019-06-04 ENCOUNTER — LAB VISIT (OUTPATIENT)
Dept: LAB | Facility: HOSPITAL | Age: 64
End: 2019-06-04
Attending: FAMILY MEDICINE
Payer: MEDICARE

## 2019-06-04 ENCOUNTER — OFFICE VISIT (OUTPATIENT)
Dept: FAMILY MEDICINE | Facility: CLINIC | Age: 64
End: 2019-06-04
Payer: MEDICARE

## 2019-06-04 VITALS
TEMPERATURE: 98 F | BODY MASS INDEX: 28.96 KG/M2 | HEART RATE: 61 BPM | DIASTOLIC BLOOD PRESSURE: 86 MMHG | WEIGHT: 184.5 LBS | SYSTOLIC BLOOD PRESSURE: 120 MMHG | HEIGHT: 67 IN | OXYGEN SATURATION: 99 %

## 2019-06-04 DIAGNOSIS — Z79.899 MEDICATION MANAGEMENT: ICD-10-CM

## 2019-06-04 DIAGNOSIS — E66.3 OVERWEIGHT (BMI 25.0-29.9): ICD-10-CM

## 2019-06-04 DIAGNOSIS — E55.9 VITAMIN D DEFICIENCY: ICD-10-CM

## 2019-06-04 DIAGNOSIS — N39.3 SI (STRESS INCONTINENCE), FEMALE: ICD-10-CM

## 2019-06-04 DIAGNOSIS — Z00.00 ROUTINE GENERAL MEDICAL EXAMINATION AT A HEALTH CARE FACILITY: Primary | ICD-10-CM

## 2019-06-04 DIAGNOSIS — Z98.84 HISTORY OF BARIATRIC SURGERY: ICD-10-CM

## 2019-06-04 DIAGNOSIS — I10 BENIGN ESSENTIAL HYPERTENSION: ICD-10-CM

## 2019-06-04 DIAGNOSIS — Z23 NEED FOR SHINGLES VACCINE: ICD-10-CM

## 2019-06-04 DIAGNOSIS — M17.11 PRIMARY OSTEOARTHRITIS OF RIGHT KNEE: ICD-10-CM

## 2019-06-04 DIAGNOSIS — J34.89 NASAL SORE: ICD-10-CM

## 2019-06-04 DIAGNOSIS — Z00.00 ROUTINE GENERAL MEDICAL EXAMINATION AT A HEALTH CARE FACILITY: ICD-10-CM

## 2019-06-04 DIAGNOSIS — I87.2 VENOUS INSUFFICIENCY: ICD-10-CM

## 2019-06-04 DIAGNOSIS — Z12.31 ENCOUNTER FOR SCREENING MAMMOGRAM FOR BREAST CANCER: ICD-10-CM

## 2019-06-04 LAB
25(OH)D3+25(OH)D2 SERPL-MCNC: 26 NG/ML (ref 30–96)
ALBUMIN SERPL BCP-MCNC: 4 G/DL (ref 3.5–5.2)
ALP SERPL-CCNC: 76 U/L (ref 55–135)
ALT SERPL W/O P-5'-P-CCNC: 17 U/L (ref 10–44)
ANION GAP SERPL CALC-SCNC: 6 MMOL/L (ref 8–16)
AST SERPL-CCNC: 18 U/L (ref 10–40)
BASOPHILS # BLD AUTO: 0.02 K/UL (ref 0–0.2)
BASOPHILS NFR BLD: 0.4 % (ref 0–1.9)
BILIRUB SERPL-MCNC: 0.8 MG/DL (ref 0.1–1)
BUN SERPL-MCNC: 22 MG/DL (ref 8–23)
CALCIUM SERPL-MCNC: 10.2 MG/DL (ref 8.7–10.5)
CHLORIDE SERPL-SCNC: 101 MMOL/L (ref 95–110)
CHOLEST SERPL-MCNC: 223 MG/DL (ref 120–199)
CHOLEST/HDLC SERPL: 2.3 {RATIO} (ref 2–5)
CO2 SERPL-SCNC: 32 MMOL/L (ref 23–29)
CREAT SERPL-MCNC: 0.9 MG/DL (ref 0.5–1.4)
DIFFERENTIAL METHOD: ABNORMAL
EOSINOPHIL # BLD AUTO: 0.1 K/UL (ref 0–0.5)
EOSINOPHIL NFR BLD: 1.3 % (ref 0–8)
ERYTHROCYTE [DISTWIDTH] IN BLOOD BY AUTOMATED COUNT: 12.6 % (ref 11.5–14.5)
EST. GFR  (AFRICAN AMERICAN): >60 ML/MIN/1.73 M^2
EST. GFR  (NON AFRICAN AMERICAN): >60 ML/MIN/1.73 M^2
ESTIMATED AVG GLUCOSE: 103 MG/DL (ref 68–131)
GLUCOSE SERPL-MCNC: 89 MG/DL (ref 70–110)
HBA1C MFR BLD HPLC: 5.2 % (ref 4–5.6)
HCT VFR BLD AUTO: 41.8 % (ref 37–48.5)
HDLC SERPL-MCNC: 98 MG/DL (ref 40–75)
HDLC SERPL: 43.9 % (ref 20–50)
HGB BLD-MCNC: 14.1 G/DL (ref 12–16)
LDLC SERPL CALC-MCNC: 112.6 MG/DL (ref 63–159)
LYMPHOCYTES # BLD AUTO: 1.8 K/UL (ref 1–4.8)
LYMPHOCYTES NFR BLD: 33.6 % (ref 18–48)
MCH RBC QN AUTO: 31 PG (ref 27–31)
MCHC RBC AUTO-ENTMCNC: 33.7 G/DL (ref 32–36)
MCV RBC AUTO: 92 FL (ref 82–98)
MONOCYTES # BLD AUTO: 0.4 K/UL (ref 0.3–1)
MONOCYTES NFR BLD: 7.3 % (ref 4–15)
NEUTROPHILS # BLD AUTO: 3.1 K/UL (ref 1.8–7.7)
NEUTROPHILS NFR BLD: 57.4 % (ref 38–73)
NONHDLC SERPL-MCNC: 125 MG/DL
PLATELET # BLD AUTO: 175 K/UL (ref 150–350)
PMV BLD AUTO: 9 FL (ref 9.2–12.9)
POTASSIUM SERPL-SCNC: 4.1 MMOL/L (ref 3.5–5.1)
PROT SERPL-MCNC: 7.2 G/DL (ref 6–8.4)
RBC # BLD AUTO: 4.55 M/UL (ref 4–5.4)
SODIUM SERPL-SCNC: 139 MMOL/L (ref 136–145)
TRIGL SERPL-MCNC: 62 MG/DL (ref 30–150)
TSH SERPL DL<=0.005 MIU/L-ACNC: 1.12 UIU/ML (ref 0.4–4)
WBC # BLD AUTO: 5.35 K/UL (ref 3.9–12.7)

## 2019-06-04 PROCEDURE — 99396 PREV VISIT EST AGE 40-64: CPT | Mod: S$GLB,,, | Performed by: FAMILY MEDICINE

## 2019-06-04 PROCEDURE — 93005 ELECTROCARDIOGRAM TRACING: CPT

## 2019-06-04 PROCEDURE — 99214 OFFICE O/P EST MOD 30 MIN: CPT | Mod: PBBFAC,PO | Performed by: FAMILY MEDICINE

## 2019-06-04 PROCEDURE — 83036 HEMOGLOBIN GLYCOSYLATED A1C: CPT

## 2019-06-04 PROCEDURE — 36415 COLL VENOUS BLD VENIPUNCTURE: CPT

## 2019-06-04 PROCEDURE — 84443 ASSAY THYROID STIM HORMONE: CPT

## 2019-06-04 PROCEDURE — 80053 COMPREHEN METABOLIC PANEL: CPT

## 2019-06-04 PROCEDURE — 93010 EKG 12-LEAD: ICD-10-PCS | Mod: ,,, | Performed by: INTERNAL MEDICINE

## 2019-06-04 PROCEDURE — 99999 PR PBB SHADOW E&M-EST. PATIENT-LVL IV: CPT | Mod: PBBFAC,,, | Performed by: FAMILY MEDICINE

## 2019-06-04 PROCEDURE — 82306 VITAMIN D 25 HYDROXY: CPT

## 2019-06-04 PROCEDURE — 99396 PR PREVENTIVE VISIT,EST,40-64: ICD-10-PCS | Mod: S$GLB,,, | Performed by: FAMILY MEDICINE

## 2019-06-04 PROCEDURE — 85025 COMPLETE CBC W/AUTO DIFF WBC: CPT

## 2019-06-04 PROCEDURE — 80061 LIPID PANEL: CPT

## 2019-06-04 PROCEDURE — 93010 ELECTROCARDIOGRAM REPORT: CPT | Mod: ,,, | Performed by: INTERNAL MEDICINE

## 2019-06-04 PROCEDURE — 99999 PR PBB SHADOW E&M-EST. PATIENT-LVL IV: ICD-10-PCS | Mod: PBBFAC,,, | Performed by: FAMILY MEDICINE

## 2019-06-04 RX ORDER — HYDROCHLOROTHIAZIDE 25 MG/1
25 TABLET ORAL DAILY
Qty: 90 TABLET | Refills: 3 | Status: SHIPPED | OUTPATIENT
Start: 2019-06-04 | End: 2020-01-30 | Stop reason: SDUPTHER

## 2019-06-04 NOTE — TELEPHONE ENCOUNTER
----- Message from Vianney Downs MD sent at 6/4/2019 10:12 AM CDT -----  Contact: [phone]/ [facility]  That is fine thank you   ----- Message -----  From: Rena Gallardo MA  Sent: 6/4/2019  10:09 AM  To: Vianney Downs MD    Pharmacy would like to know if they can switch the RX of mupirocin calcium 2% nasl oint (BACTROBAN) 2 % to regular bactroban as they are out of the nasal ointment. Please Advise.    ----- Message -----  From: Vlaeria Mercado  Sent: 6/4/2019   9:38 AM  To: [facility] Staff    Type:  Pharmacy Calling to Clarify an RX    Name of Caller: Greer  Pharmacy Name: [facility]  Prescription Name: mupirocin calcium 2% nasl oint (BACTROBAN) 2 % Oint  What do they need to clarify?: Greer would like to know if Rx can be changed to regular bactroban as they are out of the nasal ointment  Best Call Back Number: [phone]  Additional Information: n/a

## 2019-06-04 NOTE — PATIENT INSTRUCTIONS
HEALTH MAINTENANCE REVIEWED: LABS/MAMMO PENDING & *SHINGRIX ADVISED THRU PHARMACY* OTW UP TO DATE.

## 2019-06-04 NOTE — TELEPHONE ENCOUNTER
Returned pharmacy phone call, let them know it was okay to change the RX to regular bactroban instead of mupirocin calcium 2% nasal oint. Talked to jossy she verbalized understanding.

## 2019-06-06 ENCOUNTER — TELEPHONE (OUTPATIENT)
Dept: FAMILY MEDICINE | Facility: CLINIC | Age: 64
End: 2019-06-06

## 2019-06-06 NOTE — TELEPHONE ENCOUNTER
----- Message from Vianney Downs MD sent at 6/6/2019  1:52 PM CDT -----  Zenia your labs look fine for surgery. I would advise increasing your vitamin D supplement to 2,000 IU daily up from 1,000 IU daily as your level is still mildly low. ISAAC, please print these labs and EKG copy, along with my most recent clinic note for Ms. Knutson and place on my desk so that I can sign off on her surgical clearance and we can fax over to ortho, thanks.

## 2019-06-12 ENCOUNTER — HOSPITAL ENCOUNTER (OUTPATIENT)
Dept: RADIOLOGY | Facility: HOSPITAL | Age: 64
Discharge: HOME | End: 2019-06-12
Attending: FAMILY MEDICINE
Payer: MEDICARE

## 2019-06-12 DIAGNOSIS — Z12.31 ENCOUNTER FOR SCREENING MAMMOGRAM FOR BREAST CANCER: ICD-10-CM

## 2019-06-12 DIAGNOSIS — Z00.00 ROUTINE GENERAL MEDICAL EXAMINATION AT A HEALTH CARE FACILITY: ICD-10-CM

## 2019-06-12 PROCEDURE — 77067 MAMMO DIGITAL SCREENING BILAT WITH TOMOSYNTHESIS_CAD: ICD-10-PCS | Mod: 26,,, | Performed by: RADIOLOGY

## 2019-06-12 PROCEDURE — 77067 SCR MAMMO BI INCL CAD: CPT | Mod: 26,,, | Performed by: RADIOLOGY

## 2019-06-12 PROCEDURE — 77067 SCR MAMMO BI INCL CAD: CPT | Mod: TC

## 2019-06-12 PROCEDURE — 77063 BREAST TOMOSYNTHESIS BI: CPT | Mod: 26,,, | Performed by: RADIOLOGY

## 2019-06-12 PROCEDURE — 77063 MAMMO DIGITAL SCREENING BILAT WITH TOMOSYNTHESIS_CAD: ICD-10-PCS | Mod: 26,,, | Performed by: RADIOLOGY

## 2020-01-22 ENCOUNTER — TELEPHONE (OUTPATIENT)
Dept: FAMILY MEDICINE | Facility: CLINIC | Age: 65
End: 2020-01-22

## 2020-01-22 NOTE — TELEPHONE ENCOUNTER
Spoke to pt and states that she's having Knee Replacement surgery on 2/4. Pt was scheduled for Pre-Op Clearance on Friday with Dr. Downs.

## 2020-01-22 NOTE — TELEPHONE ENCOUNTER
----- Message from Luke Parikh sent at 1/22/2020 10:34 AM CST -----  Contact: [phone] / [facility]   Patient states she needs to speak with your office regarding a medical clearance for an upcoming procedure. Please Advise.

## 2020-01-24 ENCOUNTER — LAB VISIT (OUTPATIENT)
Dept: LAB | Facility: HOSPITAL | Age: 65
End: 2020-01-24
Attending: FAMILY MEDICINE
Payer: MEDICARE

## 2020-01-24 ENCOUNTER — OFFICE VISIT (OUTPATIENT)
Dept: FAMILY MEDICINE | Facility: CLINIC | Age: 65
End: 2020-01-24
Payer: MEDICARE

## 2020-01-24 VITALS
HEIGHT: 67 IN | HEART RATE: 69 BPM | OXYGEN SATURATION: 98 % | DIASTOLIC BLOOD PRESSURE: 82 MMHG | BODY MASS INDEX: 28.48 KG/M2 | WEIGHT: 181.44 LBS | SYSTOLIC BLOOD PRESSURE: 120 MMHG

## 2020-01-24 DIAGNOSIS — I10 BENIGN ESSENTIAL HYPERTENSION: ICD-10-CM

## 2020-01-24 DIAGNOSIS — G89.29 CHRONIC PAIN OF RIGHT KNEE: ICD-10-CM

## 2020-01-24 DIAGNOSIS — E55.9 VITAMIN D DEFICIENCY: ICD-10-CM

## 2020-01-24 DIAGNOSIS — M25.561 CHRONIC PAIN OF RIGHT KNEE: ICD-10-CM

## 2020-01-24 DIAGNOSIS — Z79.899 MEDICATION MANAGEMENT: ICD-10-CM

## 2020-01-24 DIAGNOSIS — M17.11 PRIMARY OSTEOARTHRITIS OF RIGHT KNEE: Primary | ICD-10-CM

## 2020-01-24 DIAGNOSIS — T14.8XXA BRUISING: ICD-10-CM

## 2020-01-24 DIAGNOSIS — Z01.810 PREOP CARDIOVASCULAR EXAM: ICD-10-CM

## 2020-01-24 DIAGNOSIS — E66.3 OVERWEIGHT (BMI 25.0-29.9): ICD-10-CM

## 2020-01-24 DIAGNOSIS — R79.9 ABNORMAL BLOOD CHEMISTRY: ICD-10-CM

## 2020-01-24 DIAGNOSIS — Z98.84 HISTORY OF BARIATRIC SURGERY: ICD-10-CM

## 2020-01-24 LAB
25(OH)D3+25(OH)D2 SERPL-MCNC: 28 NG/ML (ref 30–96)
ALBUMIN SERPL BCP-MCNC: 3.8 G/DL (ref 3.5–5.2)
ALP SERPL-CCNC: 68 U/L (ref 55–135)
ALT SERPL W/O P-5'-P-CCNC: 19 U/L (ref 10–44)
ANION GAP SERPL CALC-SCNC: 6 MMOL/L (ref 8–16)
APTT BLDCRRT: 25.6 SEC (ref 21–32)
AST SERPL-CCNC: 22 U/L (ref 10–40)
BASOPHILS # BLD AUTO: 0.01 K/UL (ref 0–0.2)
BASOPHILS NFR BLD: 0.2 % (ref 0–1.9)
BILIRUB SERPL-MCNC: 0.3 MG/DL (ref 0.1–1)
BUN SERPL-MCNC: 19 MG/DL (ref 8–23)
CALCIUM SERPL-MCNC: 10 MG/DL (ref 8.7–10.5)
CHLORIDE SERPL-SCNC: 102 MMOL/L (ref 95–110)
CO2 SERPL-SCNC: 33 MMOL/L (ref 23–29)
CREAT SERPL-MCNC: 1 MG/DL (ref 0.5–1.4)
DIFFERENTIAL METHOD: ABNORMAL
EOSINOPHIL # BLD AUTO: 0.1 K/UL (ref 0–0.5)
EOSINOPHIL NFR BLD: 1.2 % (ref 0–8)
ERYTHROCYTE [DISTWIDTH] IN BLOOD BY AUTOMATED COUNT: 12.9 % (ref 11.5–14.5)
EST. GFR  (AFRICAN AMERICAN): >60 ML/MIN/1.73 M^2
EST. GFR  (NON AFRICAN AMERICAN): 59 ML/MIN/1.73 M^2
FERRITIN SERPL-MCNC: 31 NG/ML (ref 20–300)
GLUCOSE SERPL-MCNC: 89 MG/DL (ref 70–110)
HCT VFR BLD AUTO: 39.8 % (ref 37–48.5)
HGB BLD-MCNC: 13 G/DL (ref 12–16)
INR PPP: 0.9 (ref 0.8–1.2)
IRON SERPL-MCNC: 89 UG/DL (ref 30–160)
LYMPHOCYTES # BLD AUTO: 1.5 K/UL (ref 1–4.8)
LYMPHOCYTES NFR BLD: 34.8 % (ref 18–48)
MCH RBC QN AUTO: 30.7 PG (ref 27–31)
MCHC RBC AUTO-ENTMCNC: 32.7 G/DL (ref 32–36)
MCV RBC AUTO: 94 FL (ref 82–98)
MONOCYTES # BLD AUTO: 0.3 K/UL (ref 0.3–1)
MONOCYTES NFR BLD: 7 % (ref 4–15)
NEUTROPHILS # BLD AUTO: 2.4 K/UL (ref 1.8–7.7)
NEUTROPHILS NFR BLD: 56.8 % (ref 38–73)
PLATELET # BLD AUTO: 161 K/UL (ref 150–350)
PMV BLD AUTO: 9.1 FL (ref 9.2–12.9)
POTASSIUM SERPL-SCNC: 4.6 MMOL/L (ref 3.5–5.1)
PROT SERPL-MCNC: 6.9 G/DL (ref 6–8.4)
PROTHROMBIN TIME: 10 SEC (ref 9–12.5)
RBC # BLD AUTO: 4.23 M/UL (ref 4–5.4)
SATURATED IRON: 24 % (ref 20–50)
SODIUM SERPL-SCNC: 141 MMOL/L (ref 136–145)
TOTAL IRON BINDING CAPACITY: 376 UG/DL (ref 250–450)
TRANSFERRIN SERPL-MCNC: 254 MG/DL (ref 200–375)
TSH SERPL DL<=0.005 MIU/L-ACNC: 0.78 UIU/ML (ref 0.4–4)
WBC # BLD AUTO: 4.28 K/UL (ref 3.9–12.7)

## 2020-01-24 PROCEDURE — 3008F PR BODY MASS INDEX (BMI) DOCUMENTED: ICD-10-PCS | Mod: CPTII,S$GLB,, | Performed by: FAMILY MEDICINE

## 2020-01-24 PROCEDURE — 99999 PR PBB SHADOW E&M-EST. PATIENT-LVL III: ICD-10-PCS | Mod: PBBFAC,,, | Performed by: FAMILY MEDICINE

## 2020-01-24 PROCEDURE — 85730 THROMBOPLASTIN TIME PARTIAL: CPT

## 2020-01-24 PROCEDURE — 99214 PR OFFICE/OUTPT VISIT, EST, LEVL IV, 30-39 MIN: ICD-10-PCS | Mod: S$GLB,,, | Performed by: FAMILY MEDICINE

## 2020-01-24 PROCEDURE — 85610 PROTHROMBIN TIME: CPT

## 2020-01-24 PROCEDURE — 36415 COLL VENOUS BLD VENIPUNCTURE: CPT

## 2020-01-24 PROCEDURE — 3079F PR MOST RECENT DIASTOLIC BLOOD PRESSURE 80-89 MM HG: ICD-10-PCS | Mod: CPTII,S$GLB,, | Performed by: FAMILY MEDICINE

## 2020-01-24 PROCEDURE — 3074F PR MOST RECENT SYSTOLIC BLOOD PRESSURE < 130 MM HG: ICD-10-PCS | Mod: CPTII,S$GLB,, | Performed by: FAMILY MEDICINE

## 2020-01-24 PROCEDURE — 93005 ELECTROCARDIOGRAM TRACING: CPT | Mod: S$GLB,,, | Performed by: FAMILY MEDICINE

## 2020-01-24 PROCEDURE — 80053 COMPREHEN METABOLIC PANEL: CPT

## 2020-01-24 PROCEDURE — 1101F PT FALLS ASSESS-DOCD LE1/YR: CPT | Mod: CPTII,S$GLB,, | Performed by: FAMILY MEDICINE

## 2020-01-24 PROCEDURE — 99214 OFFICE O/P EST MOD 30 MIN: CPT | Mod: S$GLB,,, | Performed by: FAMILY MEDICINE

## 2020-01-24 PROCEDURE — 82728 ASSAY OF FERRITIN: CPT

## 2020-01-24 PROCEDURE — 3074F SYST BP LT 130 MM HG: CPT | Mod: CPTII,S$GLB,, | Performed by: FAMILY MEDICINE

## 2020-01-24 PROCEDURE — 3008F BODY MASS INDEX DOCD: CPT | Mod: CPTII,S$GLB,, | Performed by: FAMILY MEDICINE

## 2020-01-24 PROCEDURE — 1101F PR PT FALLS ASSESS DOC 0-1 FALLS W/OUT INJ PAST YR: ICD-10-PCS | Mod: CPTII,S$GLB,, | Performed by: FAMILY MEDICINE

## 2020-01-24 PROCEDURE — 3079F DIAST BP 80-89 MM HG: CPT | Mod: CPTII,S$GLB,, | Performed by: FAMILY MEDICINE

## 2020-01-24 PROCEDURE — 85025 COMPLETE CBC W/AUTO DIFF WBC: CPT

## 2020-01-24 PROCEDURE — 93010 ELECTROCARDIOGRAM REPORT: CPT | Mod: S$GLB,,, | Performed by: INTERNAL MEDICINE

## 2020-01-24 PROCEDURE — 93010 EKG 12-LEAD: ICD-10-PCS | Mod: S$GLB,,, | Performed by: INTERNAL MEDICINE

## 2020-01-24 PROCEDURE — 82306 VITAMIN D 25 HYDROXY: CPT

## 2020-01-24 PROCEDURE — 93005 EKG 12-LEAD: ICD-10-PCS | Mod: S$GLB,,, | Performed by: FAMILY MEDICINE

## 2020-01-24 PROCEDURE — 83540 ASSAY OF IRON: CPT

## 2020-01-24 PROCEDURE — 84443 ASSAY THYROID STIM HORMONE: CPT

## 2020-01-24 PROCEDURE — 99999 PR PBB SHADOW E&M-EST. PATIENT-LVL III: CPT | Mod: PBBFAC,,, | Performed by: FAMILY MEDICINE

## 2020-01-24 NOTE — PROGRESS NOTES
Office Visit    Patient Name: Zenia Canela    : 1955  MRN: [id number]    Subjective:  Zenia is a 64 y.o. female who presents today for:    Pre-op Exam    Surgery: RIGHT KNEE REPLACEMENT  Indication: PROGRESSIVE PAIN AND SWELLING OF RIGHT KNEE, INTERFERING WITH ACTIVITIES.   Surgeon and anticipated date of surgery: SHIRA, DATE OF SURGERY 2020     Feeling Healthy and Well Without Symptoms of Illness: yes, denies, f/c/n/v, sore throat/URI sx, dysuria, diarrhea, rash    Exercise Tolerance:  Can walk BRISKLY FOR GREATER THAN 20 MINUTES, CAN CLIMB HILLS AND STAIRS WITH NO CARDIOPULMONARY SYMPTOMS and denies chest pain, shortness of breath, palpitations, dizziness/lightheadedness, edema    Previous Trouble with Anesthesia: NO PREVIOUS TROUBLE WITH GENERAL ANESTHESIA-- general anesthesia panned for TKA    Easy Bleeding/Bruising? EASY BRUISING BUT NOT BLEEDING,  Use of anticoagulants, blood thinners? NO, ONLY TAKING TYLENOL AS NEEDED FOR PAIN-NO ASPIRIN OR NSAIDS    Chronic Conditions well Controlled: yes, hypertension is well controlled on hydrochlorothiazide 25 mg daily.        Past Medical History  Past Medical History:   Diagnosis Date    Benign essential hypertension 2012    Venous insufficiency 2012       Past Surgical History  Past Surgical History:   Procedure Laterality Date    BARIATRIC SURGERY      gastric sleeve    CARPAL TUNNEL RELEASE       SECTION, LOW TRANSVERSE      CHOLECYSTECTOMY      HERNIA REPAIR         Family History  Family History   Problem Relation Age of Onset    Stroke Father     Heart attack Father     COPD Mother     Cataracts Mother     Heart disease Unknown     Hyperlipidemia Unknown     Cataracts Sister     Amblyopia Neg Hx     Blindness Neg Hx     Glaucoma Neg Hx     Macular degeneration Neg Hx     Retinal detachment Neg Hx     Strabismus Neg Hx        Social History  Social History     Socioeconomic History    Marital status:       Spouse name: Not on file    Number of children: Not on file    Years of education: Not on file    Highest education level: Not on file   Occupational History     Employer: [employer]   Social Needs    Financial resource strain: Not on file    Food insecurity:     Worry: Not on file     Inability: Not on file    Transportation needs:     Medical: Not on file     Non-medical: Not on file   Tobacco Use    Smoking status: Never Smoker    Smokeless tobacco: Never Used   Substance and Sexual Activity    Alcohol use: No     Comment: Moderate    Drug use: No    Sexual activity: Not on file   Lifestyle    Physical activity:     Days per week: Not on file     Minutes per session: Not on file    Stress: Not on file   Relationships    Social connections:     Talks on phone: Not on file     Gets together: Not on file     Attends Sikhism service: Not on file     Active member of club or organization: Not on file     Attends meetings of clubs or organizations: Not on file     Relationship status: Not on file   Other Topics Concern    Not on file   Social History Narrative    Mom is Hansa Savage. She is a .  No smoking.         Current Medications  Medications reviewed and updated.     Allergies   Review of patient's allergies indicates:   Allergen Reactions    Phenytoin sodium extended      Other reaction(s): jittery       Review of Systems (Pertinent positives)  Review of Systems   Constitutional: Negative for chills, fever and unexpected weight change.   HENT: Negative for congestion, sinus pain and sore throat.    Eyes: Negative for visual disturbance.   Respiratory: Negative for chest tightness and shortness of breath.    Cardiovascular: Negative for chest pain, palpitations and leg swelling.   Gastrointestinal: Negative for constipation, diarrhea, nausea and vomiting.   Genitourinary: Negative for dysuria and hematuria.   Musculoskeletal: Positive for arthralgias and  "joint swelling.   Neurological: Negative for dizziness, light-headedness and headaches.       /82   Pulse 69   Ht 5' 7" (1.702 m)   Wt 82.3 kg (181 lb 7 oz)   LMP  (LMP Unknown)   SpO2 98%   BMI 28.42 kg/m²     Physical Exam   Constitutional: She is oriented to person, place, and time. She appears well-developed and well-nourished. No distress.   HENT:   Head: Normocephalic and atraumatic.   Mouth/Throat: Oropharynx is clear and moist. No oropharyngeal exudate.   Eyes: Conjunctivae are normal.   Cardiovascular: Normal rate and regular rhythm.   Pulmonary/Chest: Effort normal and breath sounds normal.   Abdominal: Soft. Bowel sounds are normal.   Musculoskeletal: She exhibits edema (trace bilateral LE edema).   Neurological: She is alert and oriented to person, place, and time.   Skin: Skin is warm and dry.   Psychiatric: She has a normal mood and affect.   Vitals reviewed.        Assessment/Plan:  Zenia Canela is a 64 y.o. female who presents today for :    Zenia was seen today for pre-op exam.    Diagnoses and all orders for this visit:    Primary osteoarthritis of right knee  Comments:  Scheduled for right knee replacement 02/04/2020.    Chronic pain of right knee  Comments:  Has failed conservative treatment and now is having knee replacement.  Currently taking Tylenol p.r.n.    Preop cardiovascular exam  Comments:  In office EKG unremarkable. > 4 METS exercise tolerance.  Hypertension controlled, will fax preoperative clearance to ortho pending lab results.  Orders:  -     IN OFFICE EKG 12-LEAD (to [place])  -     Comprehensive metabolic panel; Future  -     CBC auto differential; Future  -     Protime-INR; Future  -     APTT; Future    Benign essential hypertension  Comments:  Controlled on HCTZ 25 mg daily  Orders:  -     IN OFFICE EKG 12-LEAD (to [facility])  -     Comprehensive metabolic panel; Future  -     TSH; Future    Vitamin D deficiency  Comments:  On vitamin-D supplementation, recheck level " with labs.  Orders:  -     Vitamin D; Future    Overweight (BMI 25.0-29.9)    History of bariatric surgery    Bruising  Comments:  History of bariatric surgery, so she is higher risk for low iron, check iron studies with preop labs given recent easy bruising  Orders:  -     CBC auto differential; Future  -     Iron and TIBC; Future  -     Ferritin; Future  -     Protime-INR; Future  -     APTT; Future    Medication management  -     Comprehensive metabolic panel; Future  -     CBC auto differential; Future  -     Iron and TIBC; Future  -     Ferritin; Future  -     Vitamin D; Future  -     Protime-INR; Future  -     APTT; Future  -     TSH; Future    Abnormal blood chemistry  -     Iron and TIBC; Future  -     Ferritin; Future            ICD-10-CM ICD-9-CM    1. Primary osteoarthritis of right knee M17.11 715.16     Scheduled for right knee replacement 02/04/2020.   2. Chronic pain of right knee M25.561 719.46     G89.29 338.29     Has failed conservative treatment and now is having knee replacement.  Currently taking Tylenol p.r.n.   3. Preop cardiovascular exam Z01.810 V72.81 IN OFFICE EKG 12-LEAD (to [place])      Comprehensive metabolic panel      CBC auto differential      Protime-INR      APTT    In office EKG unremarkable. > 4 METS exercise tolerance.  Hypertension controlled, will fax preoperative clearance to ortho pending lab results.   4. Benign essential hypertension I10 401.1 IN OFFICE EKG 12-LEAD (to [place])      Comprehensive metabolic panel      TSH    Controlled on HCTZ 25 mg daily   5. Vitamin D deficiency E55.9 268.9 Vitamin D    On vitamin-D supplementation, recheck level with labs.   6. Overweight (BMI 25.0-29.9) E66.3 278.02    7. History of bariatric surgery Z98.84 V45.86    8. Bruising T14.8XXA 924.9 CBC auto differential      Iron and TIBC      Ferritin      Protime-INR      APTT    History of bariatric surgery, so she is higher risk for low iron, check iron studies with preop labs given recent  easy bruising   9. Medication management Z79.899 V58.69 Comprehensive metabolic panel      CBC auto differential      Iron and TIBC      Ferritin      Vitamin D      Protime-INR      APTT      TSH   10. Abnormal blood chemistry R79.9 790.6 Iron and TIBC      Ferritin       There are no Patient Instructions on file for this visit.      Follow up for to follow up on lab results, return as needed for new concerns.

## 2020-01-27 ENCOUNTER — TELEPHONE (OUTPATIENT)
Dept: FAMILY MEDICINE | Facility: CLINIC | Age: 65
End: 2020-01-27

## 2020-01-27 NOTE — TELEPHONE ENCOUNTER
----- Message from Beronica Coyle sent at 1/27/2020 12:16 PM CST -----  Contact: pt  Pt called in to give a fx number to the nurse Maeve Chase. Fax # [phone]    Pt stated that she needs the authorization faxed to this number as well as the one from this morning.    Pt can be reached at [phone]    Thank you

## 2020-01-27 NOTE — TELEPHONE ENCOUNTER
Returned pt's phone call, pt asked for information to be faxed for clearance to Dr office doing surgery, went ahead and faxed that information.

## 2020-01-27 NOTE — TELEPHONE ENCOUNTER
----- Message from Vianney Downs MD sent at 1/26/2020  9:15 AM CST -----  Zenia- your labs all look good and you are clear for surgery. I will have Rena get a clearance note from me and she will fax over labs, EKG and most recent office note. I would advise doubling your vitamin D supplement as your level is still a bit low-- ie if you are taking 1,000 IU currently then increase to 2,000 IU daily. Please clarify with Rena your updated supplement dose and she will ensure your chart is accurate. I hope your surgery goes well, dr Downs

## 2020-01-27 NOTE — TELEPHONE ENCOUNTER
Called pt and let her know dr results. I also let her know that she is cleared for surgery. Pt verbalized understanding.

## 2020-01-30 DIAGNOSIS — I10 BENIGN ESSENTIAL HYPERTENSION: ICD-10-CM

## 2020-01-30 RX ORDER — HYDROCHLOROTHIAZIDE 25 MG/1
25 TABLET ORAL DAILY
Qty: 90 TABLET | Refills: 3 | Status: SHIPPED | OUTPATIENT
Start: 2020-01-30 | End: 2021-04-07

## 2020-01-30 NOTE — TELEPHONE ENCOUNTER
----- Message from Jannet Rodriges sent at 1/30/2020  9:58 AM CST -----  Contact: self  Type:  RX Refill Request    Who Called:   Patient   Refill or New Rx:refill  RX Name and Strength:  HCTZ  How is the patient currently taking it? (ex. 1XDay):  Is this a 30 day or 90 day RX  Preferred Pharmacy with phone number:[facility]  Local or Mail Order:  Ordering Provider:Dr Downs  Would the patient rather a call back or a response via [facility]?   Best Call Back Number: [phone]  Additional Information:

## 2020-10-12 ENCOUNTER — TELEPHONE (OUTPATIENT)
Dept: FAMILY MEDICINE | Facility: CLINIC | Age: 65
End: 2020-10-12

## 2020-10-12 NOTE — TELEPHONE ENCOUNTER
----- Message from Chikis Renner sent at 10/12/2020 10:54 AM CDT -----  Contact: [phone]  Pt Zenia Canela calling regarding [id number] an appt with the Doctor due to having a bump on the chin        Please call and advise

## 2020-10-13 ENCOUNTER — OFFICE VISIT (OUTPATIENT)
Dept: INTERNAL MEDICINE | Facility: CLINIC | Age: 65
End: 2020-10-13
Payer: MEDICARE

## 2020-10-13 VITALS
TEMPERATURE: 97 F | BODY MASS INDEX: 29.03 KG/M2 | HEIGHT: 67 IN | SYSTOLIC BLOOD PRESSURE: 122 MMHG | DIASTOLIC BLOOD PRESSURE: 78 MMHG | OXYGEN SATURATION: 96 % | HEART RATE: 60 BPM | WEIGHT: 184.94 LBS

## 2020-10-13 DIAGNOSIS — R22.1 NECK MASS: Primary | ICD-10-CM

## 2020-10-13 DIAGNOSIS — D17.9 LIPOMA, UNSPECIFIED SITE: ICD-10-CM

## 2020-10-13 PROCEDURE — 3078F PR MOST RECENT DIASTOLIC BLOOD PRESSURE < 80 MM HG: ICD-10-PCS | Mod: CPTII,S$GLB,, | Performed by: INTERNAL MEDICINE

## 2020-10-13 PROCEDURE — 3078F DIAST BP <80 MM HG: CPT | Mod: CPTII,S$GLB,, | Performed by: INTERNAL MEDICINE

## 2020-10-13 PROCEDURE — 1101F PT FALLS ASSESS-DOCD LE1/YR: CPT | Mod: CPTII,S$GLB,, | Performed by: INTERNAL MEDICINE

## 2020-10-13 PROCEDURE — 1101F PR PT FALLS ASSESS DOC 0-1 FALLS W/OUT INJ PAST YR: ICD-10-PCS | Mod: CPTII,S$GLB,, | Performed by: INTERNAL MEDICINE

## 2020-10-13 PROCEDURE — 1126F PR PAIN SEVERITY QUANTIFIED, NO PAIN PRESENT: ICD-10-PCS | Mod: S$GLB,,, | Performed by: INTERNAL MEDICINE

## 2020-10-13 PROCEDURE — 99999 PR PBB SHADOW E&M-EST. PATIENT-LVL IV: CPT | Mod: PBBFAC,,, | Performed by: INTERNAL MEDICINE

## 2020-10-13 PROCEDURE — 3074F PR MOST RECENT SYSTOLIC BLOOD PRESSURE < 130 MM HG: ICD-10-PCS | Mod: CPTII,S$GLB,, | Performed by: INTERNAL MEDICINE

## 2020-10-13 PROCEDURE — 1159F MED LIST DOCD IN RCRD: CPT | Mod: S$GLB,,, | Performed by: INTERNAL MEDICINE

## 2020-10-13 PROCEDURE — 99214 OFFICE O/P EST MOD 30 MIN: CPT | Mod: S$GLB,,, | Performed by: INTERNAL MEDICINE

## 2020-10-13 PROCEDURE — 1159F PR MEDICATION LIST DOCUMENTED IN MEDICAL RECORD: ICD-10-PCS | Mod: S$GLB,,, | Performed by: INTERNAL MEDICINE

## 2020-10-13 PROCEDURE — 1126F AMNT PAIN NOTED NONE PRSNT: CPT | Mod: S$GLB,,, | Performed by: INTERNAL MEDICINE

## 2020-10-13 PROCEDURE — 99214 PR OFFICE/OUTPT VISIT, EST, LEVL IV, 30-39 MIN: ICD-10-PCS | Mod: S$GLB,,, | Performed by: INTERNAL MEDICINE

## 2020-10-13 PROCEDURE — 99999 PR PBB SHADOW E&M-EST. PATIENT-LVL IV: ICD-10-PCS | Mod: PBBFAC,,, | Performed by: INTERNAL MEDICINE

## 2020-10-13 PROCEDURE — 3074F SYST BP LT 130 MM HG: CPT | Mod: CPTII,S$GLB,, | Performed by: INTERNAL MEDICINE

## 2020-10-13 PROCEDURE — 3008F BODY MASS INDEX DOCD: CPT | Mod: CPTII,S$GLB,, | Performed by: INTERNAL MEDICINE

## 2020-10-13 PROCEDURE — 3008F PR BODY MASS INDEX (BMI) DOCUMENTED: ICD-10-PCS | Mod: CPTII,S$GLB,, | Performed by: INTERNAL MEDICINE

## 2020-10-13 NOTE — PATIENT INSTRUCTIONS
Lipoma, No Treatment  A lipoma is a local overgrowth of fatty tissue. It appears as a soft raised area, usually less than 2 inches across. It is a benign condition (not cancer).   Home care  General information regarding lipoma includes:  1. No special care is needed for a lipoma.  2. You can consider removal for cosmetic reasons.  3. Sometimes lipomas are uncomfortable because they put pressure on surrounding tissues. This is also a reason to have a lipoma removed.  Follow-up care  Follow up with your healthcare provider, or as advised if you want to have the lipoma removed at a later time.  When to seek medical advice  Call your healthcare provider right away if any of the following occur:  · Redness, pain, tenderness, or drainage from the lipoma  · Lipoma begins to enlarge or change shape  · Changes in the color of the skin over the lipoma  Date Last Reviewed: 6/1/2016  © 9859-7617 [employer]. [address] [place] [id number]. All rights reserved. This information is not intended as a substitute for professional medical care. Always follow your healthcare professional's instructions.

## 2020-10-13 NOTE — PROGRESS NOTES
"Subjective:       Patient ID: Zenia Canela is a 65 y.o. female.    Chief Complaint: Mass (on the patients neck. for a month)  this patient is new to me      Neck mass  Patient reports the growth on the left-sided neck for roughly 1 month.  She has not noticed any other symptoms of a increase in growth in size.  It has not been draining.  It has not been bleeding.  It does not had any skin changes.  She has not noticed pain or itching at the area.  She has not had any subjective fevers or chills.  She has no other masses elsewhere in the neck.          HPI  Review of Systems   Constitutional:        See hpi   All other systems reviewed and are negative.        Objective:       /78 (BP Location: Right arm, Patient Position: Sitting, BP Method: Medium (Manual))   Pulse 60   Temp 97.3 °F (36.3 °C) (Temporal)   Ht 5' 7" (1.702 m)   Wt 83.9 kg (184 lb 15.5 oz)   LMP  (LMP Unknown)   SpO2 96%   BMI 28.97 kg/m²     Physical Exam  Vitals signs and nursing note reviewed.   Constitutional:       General: She is not in acute distress.     Appearance: She is well-developed. She is not diaphoretic.   HENT:      Head: Normocephalic.        Nose: Nose normal.   Eyes:      General:         Right eye: No discharge.         Left eye: No discharge.      Conjunctiva/sclera: Conjunctivae normal.      Pupils: Pupils are equal, round, and reactive to light.   Neck:      Musculoskeletal: Normal range of motion.   Cardiovascular:      Rate and Rhythm: Normal rate and regular rhythm.      Heart sounds: Normal heart sounds. No murmur. No friction rub. No gallop.    Pulmonary:      Effort: Pulmonary effort is normal. No respiratory distress.   Musculoskeletal: Normal range of motion.         General: No deformity.   Skin:     General: Skin is warm.   Neurological:      Mental Status: She is alert and oriented to person, place, and time.      Cranial Nerves: No cranial nerve deficit.         Assessment:       1. Neck mass    2. " Lipoma, unspecified site        Plan:       Zenia was seen today for mass.    Diagnoses and all orders for this visit:    Neck mass  New;uncontrolled  Unclear etiology    history or pyhsical exam do not suggest abcess, cellulitis, folliculitis, LAD  DDx epidermoid cyst  Willl check US   I provided instruction on supportive care measures    reviewed signs and symptoms that should prompt return to provider or evaluation in the ED  -     US Soft Tissue Head Neck Thyroid; Future    Lipoma, unspecified site        Future Appointments   Date Time Provider Department Center   10/13/2020  1:15 PM [facility] OIC US1 [facility] [id number] [place]         Medication List with Changes/Refills   Current Medications    HYDROCHLOROTHIAZIDE (HYDRODIURIL) 25 MG TABLET    Take 1 tablet (25 mg total) by mouth once daily.    MULTIVITAMIN CAPSULE    Take 1 capsule by mouth once daily.    VITAMIN D 1000 UNITS TAB    Take 1,000 Units by mouth once daily.         Disclaimer:  This note has been generated using voice-recognition software. There may be grammatical or spelling errors that have been missed during proof-reading

## 2021-01-21 ENCOUNTER — TELEPHONE (OUTPATIENT)
Dept: FAMILY MEDICINE | Facility: CLINIC | Age: 66
End: 2021-01-21

## 2021-01-21 DIAGNOSIS — Z12.31 BREAST CANCER SCREENING BY MAMMOGRAM: Primary | ICD-10-CM

## 2021-02-02 ENCOUNTER — TELEPHONE (OUTPATIENT)
Dept: FAMILY MEDICINE | Facility: CLINIC | Age: 66
End: 2021-02-02

## 2021-02-03 ENCOUNTER — TELEPHONE (OUTPATIENT)
Dept: FAMILY MEDICINE | Facility: CLINIC | Age: 66
End: 2021-02-03

## 2021-04-01 ENCOUNTER — PATIENT MESSAGE (OUTPATIENT)
Dept: ADMINISTRATIVE | Facility: HOSPITAL | Age: 66
End: 2021-04-01

## 2021-05-04 ENCOUNTER — PATIENT MESSAGE (OUTPATIENT)
Dept: RESEARCH | Facility: HOSPITAL | Age: 66
End: 2021-05-04

## 2021-05-10 ENCOUNTER — PATIENT MESSAGE (OUTPATIENT)
Dept: RESEARCH | Facility: HOSPITAL | Age: 66
End: 2021-05-10

## 2021-06-03 ENCOUNTER — TELEPHONE (OUTPATIENT)
Dept: FAMILY MEDICINE | Facility: CLINIC | Age: 66
End: 2021-06-03

## 2021-06-03 DIAGNOSIS — I10 BENIGN ESSENTIAL HYPERTENSION: ICD-10-CM

## 2021-06-03 DIAGNOSIS — R79.9 ABNORMAL BLOOD CHEMISTRY: ICD-10-CM

## 2021-06-03 DIAGNOSIS — Z79.899 MEDICATION MANAGEMENT: ICD-10-CM

## 2021-06-03 DIAGNOSIS — E55.9 VITAMIN D DEFICIENCY: ICD-10-CM

## 2021-06-03 DIAGNOSIS — Z98.84 HISTORY OF BARIATRIC SURGERY: ICD-10-CM

## 2021-06-03 DIAGNOSIS — Z13.21 ENCOUNTER FOR SCREENING FOR NUTRITIONAL DISORDER: Primary | ICD-10-CM

## 2021-06-14 ENCOUNTER — HOSPITAL ENCOUNTER (OUTPATIENT)
Dept: RADIOLOGY | Facility: HOSPITAL | Age: 66
Discharge: HOME | End: 2021-06-14
Attending: FAMILY MEDICINE
Payer: MEDICARE

## 2021-06-14 DIAGNOSIS — Z12.31 BREAST CANCER SCREENING BY MAMMOGRAM: ICD-10-CM

## 2021-06-14 PROCEDURE — 77067 MAMMO DIGITAL SCREENING BILAT WITH TOMO: ICD-10-PCS | Mod: 26,,, | Performed by: RADIOLOGY

## 2021-06-14 PROCEDURE — 77067 SCR MAMMO BI INCL CAD: CPT | Mod: 26,,, | Performed by: RADIOLOGY

## 2021-06-14 PROCEDURE — 77067 SCR MAMMO BI INCL CAD: CPT | Mod: TC

## 2021-06-14 PROCEDURE — 77063 BREAST TOMOSYNTHESIS BI: CPT | Mod: 26,,, | Performed by: RADIOLOGY

## 2021-06-14 PROCEDURE — 77063 MAMMO DIGITAL SCREENING BILAT WITH TOMO: ICD-10-PCS | Mod: 26,,, | Performed by: RADIOLOGY

## 2021-06-22 PROBLEM — Z74.09 IMPAIRED MOBILITY: Status: ACTIVE | Noted: 2021-06-22

## 2021-07-07 ENCOUNTER — LAB VISIT (OUTPATIENT)
Dept: LAB | Facility: HOSPITAL | Age: 66
End: 2021-07-07
Attending: FAMILY MEDICINE
Payer: MEDICARE

## 2021-07-07 DIAGNOSIS — R79.9 ABNORMAL BLOOD CHEMISTRY: ICD-10-CM

## 2021-07-07 DIAGNOSIS — Z13.21 ENCOUNTER FOR SCREENING FOR NUTRITIONAL DISORDER: ICD-10-CM

## 2021-07-07 DIAGNOSIS — Z98.84 HISTORY OF BARIATRIC SURGERY: ICD-10-CM

## 2021-07-07 DIAGNOSIS — Z79.899 MEDICATION MANAGEMENT: ICD-10-CM

## 2021-07-07 DIAGNOSIS — I10 BENIGN ESSENTIAL HYPERTENSION: ICD-10-CM

## 2021-07-07 DIAGNOSIS — E55.9 VITAMIN D DEFICIENCY: ICD-10-CM

## 2021-07-07 LAB
25(OH)D3+25(OH)D2 SERPL-MCNC: 29 NG/ML (ref 30–96)
ALBUMIN SERPL BCP-MCNC: 3.5 G/DL (ref 3.5–5.2)
ALP SERPL-CCNC: 67 U/L (ref 55–135)
ALT SERPL W/O P-5'-P-CCNC: 23 U/L (ref 10–44)
ANION GAP SERPL CALC-SCNC: 9 MMOL/L (ref 8–16)
AST SERPL-CCNC: 19 U/L (ref 10–40)
BASOPHILS # BLD AUTO: 0.02 K/UL (ref 0–0.2)
BASOPHILS NFR BLD: 0.4 % (ref 0–1.9)
BILIRUB SERPL-MCNC: 0.5 MG/DL (ref 0.1–1)
BUN SERPL-MCNC: 17 MG/DL (ref 8–23)
CALCIUM SERPL-MCNC: 9.1 MG/DL (ref 8.7–10.5)
CHLORIDE SERPL-SCNC: 104 MMOL/L (ref 95–110)
CHOLEST SERPL-MCNC: 180 MG/DL (ref 120–199)
CHOLEST/HDLC SERPL: 2.1 {RATIO} (ref 2–5)
CO2 SERPL-SCNC: 27 MMOL/L (ref 23–29)
CREAT SERPL-MCNC: 0.9 MG/DL (ref 0.5–1.4)
DIFFERENTIAL METHOD: ABNORMAL
EOSINOPHIL # BLD AUTO: 0.1 K/UL (ref 0–0.5)
EOSINOPHIL NFR BLD: 1 % (ref 0–8)
ERYTHROCYTE [DISTWIDTH] IN BLOOD BY AUTOMATED COUNT: 12.6 % (ref 11.5–14.5)
EST. GFR  (AFRICAN AMERICAN): >60 ML/MIN/1.73 M^2
EST. GFR  (NON AFRICAN AMERICAN): >60 ML/MIN/1.73 M^2
ESTIMATED AVG GLUCOSE: 105 MG/DL (ref 68–131)
FERRITIN SERPL-MCNC: 73 NG/ML (ref 20–300)
FOLATE SERPL-MCNC: 9.1 NG/ML (ref 4–24)
GLUCOSE SERPL-MCNC: 84 MG/DL (ref 70–110)
HBA1C MFR BLD: 5.3 % (ref 4–5.6)
HCT VFR BLD AUTO: 40.1 % (ref 37–48.5)
HDLC SERPL-MCNC: 84 MG/DL (ref 40–75)
HDLC SERPL: 46.7 % (ref 20–50)
HGB BLD-MCNC: 13.8 G/DL (ref 12–16)
IMM GRANULOCYTES # BLD AUTO: 0.01 K/UL (ref 0–0.04)
IMM GRANULOCYTES NFR BLD AUTO: 0.2 % (ref 0–0.5)
IRON SERPL-MCNC: 89 UG/DL (ref 30–160)
LDLC SERPL CALC-MCNC: 85.8 MG/DL (ref 63–159)
LYMPHOCYTES # BLD AUTO: 1.8 K/UL (ref 1–4.8)
LYMPHOCYTES NFR BLD: 36.1 % (ref 18–48)
MAGNESIUM SERPL-MCNC: 2 MG/DL (ref 1.6–2.6)
MCH RBC QN AUTO: 31.3 PG (ref 27–31)
MCHC RBC AUTO-ENTMCNC: 34.4 G/DL (ref 32–36)
MCV RBC AUTO: 91 FL (ref 82–98)
MONOCYTES # BLD AUTO: 0.4 K/UL (ref 0.3–1)
MONOCYTES NFR BLD: 7.6 % (ref 4–15)
NEUTROPHILS # BLD AUTO: 2.7 K/UL (ref 1.8–7.7)
NEUTROPHILS NFR BLD: 54.7 % (ref 38–73)
NONHDLC SERPL-MCNC: 96 MG/DL
NRBC BLD-RTO: 0 /100 WBC
PLATELET # BLD AUTO: 154 K/UL (ref 150–450)
PMV BLD AUTO: 9.3 FL (ref 9.2–12.9)
POTASSIUM SERPL-SCNC: 3.9 MMOL/L (ref 3.5–5.1)
PREALB SERPL-MCNC: 24 MG/DL (ref 20–43)
PROT SERPL-MCNC: 6.4 G/DL (ref 6–8.4)
RBC # BLD AUTO: 4.41 M/UL (ref 4–5.4)
SATURATED IRON: 27 % (ref 20–50)
SODIUM SERPL-SCNC: 140 MMOL/L (ref 136–145)
TOTAL IRON BINDING CAPACITY: 326 UG/DL (ref 250–450)
TRANSFERRIN SERPL-MCNC: 220 MG/DL (ref 200–375)
TRIGL SERPL-MCNC: 51 MG/DL (ref 30–150)
TSH SERPL DL<=0.005 MIU/L-ACNC: 0.73 UIU/ML (ref 0.4–4)
VIT B12 SERPL-MCNC: 1067 PG/ML (ref 210–950)
WBC # BLD AUTO: 4.85 K/UL (ref 3.9–12.7)

## 2021-07-07 PROCEDURE — 83036 HEMOGLOBIN GLYCOSYLATED A1C: CPT | Performed by: FAMILY MEDICINE

## 2021-07-07 PROCEDURE — 82607 VITAMIN B-12: CPT | Performed by: FAMILY MEDICINE

## 2021-07-07 PROCEDURE — 80053 COMPREHEN METABOLIC PANEL: CPT | Performed by: FAMILY MEDICINE

## 2021-07-07 PROCEDURE — 84443 ASSAY THYROID STIM HORMONE: CPT | Performed by: FAMILY MEDICINE

## 2021-07-07 PROCEDURE — 84134 ASSAY OF PREALBUMIN: CPT | Performed by: FAMILY MEDICINE

## 2021-07-07 PROCEDURE — 36415 COLL VENOUS BLD VENIPUNCTURE: CPT | Performed by: FAMILY MEDICINE

## 2021-07-07 PROCEDURE — 84425 ASSAY OF VITAMIN B-1: CPT | Performed by: FAMILY MEDICINE

## 2021-07-07 PROCEDURE — 85025 COMPLETE CBC W/AUTO DIFF WBC: CPT | Performed by: FAMILY MEDICINE

## 2021-07-07 PROCEDURE — 83540 ASSAY OF IRON: CPT | Performed by: FAMILY MEDICINE

## 2021-07-07 PROCEDURE — 80061 LIPID PANEL: CPT | Performed by: FAMILY MEDICINE

## 2021-07-07 PROCEDURE — 83735 ASSAY OF MAGNESIUM: CPT | Performed by: FAMILY MEDICINE

## 2021-07-07 PROCEDURE — 82746 ASSAY OF FOLIC ACID SERUM: CPT | Performed by: FAMILY MEDICINE

## 2021-07-07 PROCEDURE — 82306 VITAMIN D 25 HYDROXY: CPT | Performed by: FAMILY MEDICINE

## 2021-07-07 PROCEDURE — 82728 ASSAY OF FERRITIN: CPT | Performed by: FAMILY MEDICINE

## 2021-07-12 ENCOUNTER — OFFICE VISIT (OUTPATIENT)
Dept: FAMILY MEDICINE | Facility: CLINIC | Age: 66
End: 2021-07-12
Payer: MEDICARE

## 2021-07-12 VITALS
OXYGEN SATURATION: 98 % | HEART RATE: 69 BPM | BODY MASS INDEX: 28.41 KG/M2 | HEIGHT: 67 IN | SYSTOLIC BLOOD PRESSURE: 131 MMHG | WEIGHT: 181 LBS | DIASTOLIC BLOOD PRESSURE: 78 MMHG

## 2021-07-12 DIAGNOSIS — Z96.651 HISTORY OF TOTAL RIGHT KNEE REPLACEMENT: ICD-10-CM

## 2021-07-12 DIAGNOSIS — Z00.00 ROUTINE GENERAL MEDICAL EXAMINATION AT A HEALTH CARE FACILITY: Primary | ICD-10-CM

## 2021-07-12 DIAGNOSIS — Z98.84 HISTORY OF BARIATRIC SURGERY: ICD-10-CM

## 2021-07-12 DIAGNOSIS — M54.50 CHRONIC BILATERAL LOW BACK PAIN WITHOUT SCIATICA: ICD-10-CM

## 2021-07-12 DIAGNOSIS — Z78.0 POSTMENOPAUSAL: ICD-10-CM

## 2021-07-12 DIAGNOSIS — Z23 NEED FOR VACCINATION WITH 13-POLYVALENT PNEUMOCOCCAL CONJUGATE VACCINE: ICD-10-CM

## 2021-07-12 DIAGNOSIS — H25.9 AGE-RELATED CATARACT OF BOTH EYES, UNSPECIFIED AGE-RELATED CATARACT TYPE: ICD-10-CM

## 2021-07-12 DIAGNOSIS — I10 BENIGN ESSENTIAL HYPERTENSION: ICD-10-CM

## 2021-07-12 DIAGNOSIS — Z12.31 ENCOUNTER FOR SCREENING MAMMOGRAM FOR BREAST CANCER: ICD-10-CM

## 2021-07-12 DIAGNOSIS — M17.0 BILATERAL PRIMARY OSTEOARTHRITIS OF KNEE: ICD-10-CM

## 2021-07-12 DIAGNOSIS — E66.3 OVERWEIGHT (BMI 25.0-29.9): ICD-10-CM

## 2021-07-12 DIAGNOSIS — E55.9 VITAMIN D DEFICIENCY: ICD-10-CM

## 2021-07-12 DIAGNOSIS — Z23 NEED FOR SHINGLES VACCINE: ICD-10-CM

## 2021-07-12 DIAGNOSIS — G89.29 CHRONIC BILATERAL LOW BACK PAIN WITHOUT SCIATICA: ICD-10-CM

## 2021-07-12 PROCEDURE — 99397 PR PREVENTIVE VISIT,EST,65 & OVER: ICD-10-PCS | Mod: 25,S$GLB,, | Performed by: FAMILY MEDICINE

## 2021-07-12 PROCEDURE — 99999 PR PBB SHADOW E&M-EST. PATIENT-LVL IV: CPT | Mod: PBBFAC,,, | Performed by: FAMILY MEDICINE

## 2021-07-12 PROCEDURE — 1126F PR PAIN SEVERITY QUANTIFIED, NO PAIN PRESENT: ICD-10-PCS | Mod: S$GLB,,, | Performed by: FAMILY MEDICINE

## 2021-07-12 PROCEDURE — 99397 PER PM REEVAL EST PAT 65+ YR: CPT | Mod: 25,S$GLB,, | Performed by: FAMILY MEDICINE

## 2021-07-12 PROCEDURE — 1101F PR PT FALLS ASSESS DOC 0-1 FALLS W/OUT INJ PAST YR: ICD-10-PCS | Mod: CPTII,S$GLB,, | Performed by: FAMILY MEDICINE

## 2021-07-12 PROCEDURE — 3075F PR MOST RECENT SYSTOLIC BLOOD PRESS GE 130-139MM HG: ICD-10-PCS | Mod: CPTII,S$GLB,, | Performed by: FAMILY MEDICINE

## 2021-07-12 PROCEDURE — 3078F PR MOST RECENT DIASTOLIC BLOOD PRESSURE < 80 MM HG: ICD-10-PCS | Mod: CPTII,S$GLB,, | Performed by: FAMILY MEDICINE

## 2021-07-12 PROCEDURE — 3288F FALL RISK ASSESSMENT DOCD: CPT | Mod: CPTII,S$GLB,, | Performed by: FAMILY MEDICINE

## 2021-07-12 PROCEDURE — 3078F DIAST BP <80 MM HG: CPT | Mod: CPTII,S$GLB,, | Performed by: FAMILY MEDICINE

## 2021-07-12 PROCEDURE — G0009 ADMIN PNEUMOCOCCAL VACCINE: HCPCS | Mod: S$GLB,,, | Performed by: FAMILY MEDICINE

## 2021-07-12 PROCEDURE — 1101F PT FALLS ASSESS-DOCD LE1/YR: CPT | Mod: CPTII,S$GLB,, | Performed by: FAMILY MEDICINE

## 2021-07-12 PROCEDURE — 3008F BODY MASS INDEX DOCD: CPT | Mod: CPTII,S$GLB,, | Performed by: FAMILY MEDICINE

## 2021-07-12 PROCEDURE — 99999 PR PBB SHADOW E&M-EST. PATIENT-LVL IV: ICD-10-PCS | Mod: PBBFAC,,, | Performed by: FAMILY MEDICINE

## 2021-07-12 PROCEDURE — G0009 PNEUMOCOCCAL CONJUGATE VACCINE 13-VALENT LESS THAN 5YO & GREATER THAN: ICD-10-PCS | Mod: S$GLB,,, | Performed by: FAMILY MEDICINE

## 2021-07-12 PROCEDURE — 3075F SYST BP GE 130 - 139MM HG: CPT | Mod: CPTII,S$GLB,, | Performed by: FAMILY MEDICINE

## 2021-07-12 PROCEDURE — 90670 PCV13 VACCINE IM: CPT | Mod: S$GLB,,, | Performed by: FAMILY MEDICINE

## 2021-07-12 PROCEDURE — 1126F AMNT PAIN NOTED NONE PRSNT: CPT | Mod: S$GLB,,, | Performed by: FAMILY MEDICINE

## 2021-07-12 PROCEDURE — 90670 PNEUMOCOCCAL CONJUGATE VACCINE 13-VALENT LESS THAN 5YO & GREATER THAN: ICD-10-PCS | Mod: S$GLB,,, | Performed by: FAMILY MEDICINE

## 2021-07-12 PROCEDURE — 3288F PR FALLS RISK ASSESSMENT DOCUMENTED: ICD-10-PCS | Mod: CPTII,S$GLB,, | Performed by: FAMILY MEDICINE

## 2021-07-12 PROCEDURE — 3008F PR BODY MASS INDEX (BMI) DOCUMENTED: ICD-10-PCS | Mod: CPTII,S$GLB,, | Performed by: FAMILY MEDICINE

## 2021-07-12 RX ORDER — CHOLECALCIFEROL (VITAMIN D3) 50 MCG
1 TABLET ORAL DAILY
Qty: 100 TABLET | Refills: 3 | Status: SHIPPED | OUTPATIENT
Start: 2021-07-12 | End: 2022-05-06 | Stop reason: DRUGHIGH

## 2021-07-13 LAB — VIT B1 BLD-MCNC: 57 UG/L (ref 38–122)

## 2021-07-26 ENCOUNTER — PATIENT MESSAGE (OUTPATIENT)
Dept: FAMILY MEDICINE | Facility: CLINIC | Age: 66
End: 2021-07-26

## 2021-09-10 ENCOUNTER — PATIENT MESSAGE (OUTPATIENT)
Dept: FAMILY MEDICINE | Facility: CLINIC | Age: 66
End: 2021-09-10

## 2021-10-15 ENCOUNTER — TELEPHONE (OUTPATIENT)
Dept: FAMILY MEDICINE | Facility: CLINIC | Age: 66
End: 2021-10-15

## 2021-10-15 DIAGNOSIS — Z23 NEED FOR SHINGLES VACCINE: Primary | ICD-10-CM

## 2022-04-21 ENCOUNTER — TELEPHONE (OUTPATIENT)
Dept: FAMILY MEDICINE | Facility: CLINIC | Age: 67
End: 2022-04-21
Payer: MEDICARE

## 2022-04-21 NOTE — TELEPHONE ENCOUNTER
----- Message from Isabel Ewing sent at 4/21/2022  2:16 PM CDT -----  Contact: [phone]/Self  Type:  Sooner Appointment Request     Caller is requesting a sooner appointment.  Caller declined first available appointment listed below.  Caller will not accept being placed on the waitlist and is requesting a message be sent to doctor.  Name of Caller: pt  When is the first available appointment? 05/06/22  Symptoms or Reason: swollen right leg  Would the patient rather a call back or a response via [facility]? Call back  Best Call Back Number: [phone]  Additional Information: n/a

## 2022-04-21 NOTE — TELEPHONE ENCOUNTER
Returned patient call about an earlier appointment because her right leg is swollen. Patient said she will wait for her appointment on May 6, 2022 for her leg because she want to see Dr. Downs. Patient said she will come in tomorrow to drop off paperwork that Dr. Downs has to fill out and that has some labs she need so she can start getting weight loss injections.

## 2022-04-26 ENCOUNTER — TELEPHONE (OUTPATIENT)
Dept: FAMILY MEDICINE | Facility: CLINIC | Age: 67
End: 2022-04-26
Payer: MEDICARE

## 2022-04-26 NOTE — TELEPHONE ENCOUNTER
Returned patient call about her lab orders and paperwork that was dropped off on Friday. I explain to the patient that Dr. Downs has the paperwork but did not have the chance to input the orders for labs. I explained to the patient that I will send Dr. Downs a reminder message because she's been in clinic the past couple of days. Patient verbalized understanding. Please advise.

## 2022-04-26 NOTE — TELEPHONE ENCOUNTER
----- Message from Harry Yan sent at 4/26/2022  1:01 PM CDT -----  Contact: pt.  .Type:  Needs Medical Advice    Who Called: pt  Would the patient rather a call back or a response via [facility]? Call back  Best Call Back Number: [phone]  Additional Information: Pt. Is requesting a call back regarding her lab work form that was drop off to the office on Friday.

## 2022-04-27 ENCOUNTER — TELEPHONE (OUTPATIENT)
Dept: FAMILY MEDICINE | Facility: CLINIC | Age: 67
End: 2022-04-27
Payer: MEDICARE

## 2022-04-27 DIAGNOSIS — Z98.84 HISTORY OF BARIATRIC SURGERY: ICD-10-CM

## 2022-04-27 DIAGNOSIS — K91.2 POSTSURGICAL MALABSORPTION: ICD-10-CM

## 2022-04-27 DIAGNOSIS — I10 BENIGN ESSENTIAL HYPERTENSION: ICD-10-CM

## 2022-04-27 DIAGNOSIS — Z13.21 ENCOUNTER FOR SCREENING FOR NUTRITIONAL DISORDER: ICD-10-CM

## 2022-04-27 DIAGNOSIS — E55.9 VITAMIN D DEFICIENCY: Primary | ICD-10-CM

## 2022-04-27 DIAGNOSIS — R53.83 FATIGUE, UNSPECIFIED TYPE: ICD-10-CM

## 2022-04-27 NOTE — TELEPHONE ENCOUNTER
Here are lab orders-- she should fast as I am including her yearly cholesterol labs with the orders requested by Dr Pitts.     She should drink plenty of water though even when she is fasting from food prior to the labs.    Please schedule in advance of her upcoming appointments with me and her bariatric surgeon.

## 2022-04-27 NOTE — TELEPHONE ENCOUNTER
Called patient to schedule her appointment for labs per her request for a pre op. No answer. Left message.

## 2022-04-27 NOTE — TELEPHONE ENCOUNTER
Called patient to schedule her appointment for labs for her pre op. Patient has been scheduled by the phone staff for blood work.

## 2022-05-03 ENCOUNTER — LAB VISIT (OUTPATIENT)
Dept: LAB | Facility: HOSPITAL | Age: 67
End: 2022-05-03
Attending: FAMILY MEDICINE
Payer: MEDICARE

## 2022-05-03 DIAGNOSIS — E55.9 VITAMIN D DEFICIENCY: ICD-10-CM

## 2022-05-03 DIAGNOSIS — Z13.21 ENCOUNTER FOR SCREENING FOR NUTRITIONAL DISORDER: ICD-10-CM

## 2022-05-03 DIAGNOSIS — Z98.84 HISTORY OF BARIATRIC SURGERY: ICD-10-CM

## 2022-05-03 DIAGNOSIS — I10 BENIGN ESSENTIAL HYPERTENSION: ICD-10-CM

## 2022-05-03 DIAGNOSIS — R53.83 FATIGUE, UNSPECIFIED TYPE: ICD-10-CM

## 2022-05-03 DIAGNOSIS — K91.2 POSTSURGICAL MALABSORPTION: ICD-10-CM

## 2022-05-03 LAB
25(OH)D3+25(OH)D2 SERPL-MCNC: 24 NG/ML (ref 30–96)
ALBUMIN SERPL BCP-MCNC: 3.8 G/DL (ref 3.5–5.2)
ALP SERPL-CCNC: 56 U/L (ref 55–135)
ALT SERPL W/O P-5'-P-CCNC: 16 U/L (ref 10–44)
ANION GAP SERPL CALC-SCNC: 6 MMOL/L (ref 8–16)
AST SERPL-CCNC: 25 U/L (ref 10–40)
BASOPHILS # BLD AUTO: 0.03 K/UL (ref 0–0.2)
BASOPHILS NFR BLD: 0.8 % (ref 0–1.9)
BILIRUB SERPL-MCNC: 0.6 MG/DL (ref 0.1–1)
BUN SERPL-MCNC: 24 MG/DL (ref 8–23)
CALCIUM SERPL-MCNC: 9.9 MG/DL (ref 8.7–10.5)
CHLORIDE SERPL-SCNC: 105 MMOL/L (ref 95–110)
CHOLEST SERPL-MCNC: 196 MG/DL (ref 120–199)
CHOLEST/HDLC SERPL: 2.3 {RATIO} (ref 2–5)
CO2 SERPL-SCNC: 28 MMOL/L (ref 23–29)
CREAT SERPL-MCNC: 0.9 MG/DL (ref 0.5–1.4)
DIFFERENTIAL METHOD: ABNORMAL
EOSINOPHIL # BLD AUTO: 0 K/UL (ref 0–0.5)
EOSINOPHIL NFR BLD: 0.8 % (ref 0–8)
ERYTHROCYTE [DISTWIDTH] IN BLOOD BY AUTOMATED COUNT: 13.2 % (ref 11.5–14.5)
EST. GFR  (AFRICAN AMERICAN): >60 ML/MIN/1.73 M^2
EST. GFR  (NON AFRICAN AMERICAN): >60 ML/MIN/1.73 M^2
ESTIMATED AVG GLUCOSE: 100 MG/DL (ref 68–131)
FERRITIN SERPL-MCNC: 65 NG/ML (ref 20–300)
FOLATE SERPL-MCNC: 15.2 NG/ML (ref 4–24)
GLUCOSE SERPL-MCNC: 84 MG/DL (ref 70–110)
HBA1C MFR BLD: 5.1 % (ref 4–5.6)
HCT VFR BLD AUTO: 42.5 % (ref 37–48.5)
HDLC SERPL-MCNC: 87 MG/DL (ref 40–75)
HDLC SERPL: 44.4 % (ref 20–50)
HGB BLD-MCNC: 14.3 G/DL (ref 12–16)
IMM GRANULOCYTES # BLD AUTO: 0.01 K/UL (ref 0–0.04)
IMM GRANULOCYTES NFR BLD AUTO: 0.3 % (ref 0–0.5)
LDLC SERPL CALC-MCNC: 102 MG/DL (ref 63–159)
LYMPHOCYTES # BLD AUTO: 1.2 K/UL (ref 1–4.8)
LYMPHOCYTES NFR BLD: 31.3 % (ref 18–48)
MAGNESIUM SERPL-MCNC: 1.7 MG/DL (ref 1.6–2.6)
MCH RBC QN AUTO: 30.8 PG (ref 27–31)
MCHC RBC AUTO-ENTMCNC: 33.6 G/DL (ref 32–36)
MCV RBC AUTO: 92 FL (ref 82–98)
MONOCYTES # BLD AUTO: 0.3 K/UL (ref 0.3–1)
MONOCYTES NFR BLD: 7.8 % (ref 4–15)
NEUTROPHILS # BLD AUTO: 2.3 K/UL (ref 1.8–7.7)
NEUTROPHILS NFR BLD: 59 % (ref 38–73)
NONHDLC SERPL-MCNC: 109 MG/DL
NRBC BLD-RTO: 0 /100 WBC
PLATELET # BLD AUTO: 155 K/UL (ref 150–450)
PMV BLD AUTO: 9.3 FL (ref 9.2–12.9)
POTASSIUM SERPL-SCNC: 4.2 MMOL/L (ref 3.5–5.1)
PROT SERPL-MCNC: 6.8 G/DL (ref 6–8.4)
PTH-INTACT SERPL-MCNC: 85.5 PG/ML (ref 9–77)
RBC # BLD AUTO: 4.64 M/UL (ref 4–5.4)
SODIUM SERPL-SCNC: 139 MMOL/L (ref 136–145)
TRIGL SERPL-MCNC: 35 MG/DL (ref 30–150)
TSH SERPL DL<=0.005 MIU/L-ACNC: 0.65 UIU/ML (ref 0.4–4)
VIT B12 SERPL-MCNC: >2000 PG/ML (ref 210–950)
WBC # BLD AUTO: 3.87 K/UL (ref 3.9–12.7)

## 2022-05-03 PROCEDURE — 82607 VITAMIN B-12: CPT | Performed by: FAMILY MEDICINE

## 2022-05-03 PROCEDURE — 82306 VITAMIN D 25 HYDROXY: CPT | Performed by: FAMILY MEDICINE

## 2022-05-03 PROCEDURE — 80061 LIPID PANEL: CPT | Performed by: FAMILY MEDICINE

## 2022-05-03 PROCEDURE — 83036 HEMOGLOBIN GLYCOSYLATED A1C: CPT | Performed by: FAMILY MEDICINE

## 2022-05-03 PROCEDURE — 84443 ASSAY THYROID STIM HORMONE: CPT | Performed by: FAMILY MEDICINE

## 2022-05-03 PROCEDURE — 82728 ASSAY OF FERRITIN: CPT | Performed by: FAMILY MEDICINE

## 2022-05-03 PROCEDURE — 80053 COMPREHEN METABOLIC PANEL: CPT | Performed by: FAMILY MEDICINE

## 2022-05-03 PROCEDURE — 83735 ASSAY OF MAGNESIUM: CPT | Performed by: FAMILY MEDICINE

## 2022-05-03 PROCEDURE — 84425 ASSAY OF VITAMIN B-1: CPT | Performed by: FAMILY MEDICINE

## 2022-05-03 PROCEDURE — 83970 ASSAY OF PARATHORMONE: CPT | Performed by: FAMILY MEDICINE

## 2022-05-03 PROCEDURE — 82746 ASSAY OF FOLIC ACID SERUM: CPT | Performed by: FAMILY MEDICINE

## 2022-05-03 PROCEDURE — 85025 COMPLETE CBC W/AUTO DIFF WBC: CPT | Performed by: FAMILY MEDICINE

## 2022-05-03 PROCEDURE — 36415 COLL VENOUS BLD VENIPUNCTURE: CPT | Performed by: FAMILY MEDICINE

## 2022-05-06 ENCOUNTER — OFFICE VISIT (OUTPATIENT)
Dept: FAMILY MEDICINE | Facility: CLINIC | Age: 67
End: 2022-05-06
Payer: MEDICARE

## 2022-05-06 VITALS
DIASTOLIC BLOOD PRESSURE: 83 MMHG | BODY MASS INDEX: 28.61 KG/M2 | OXYGEN SATURATION: 99 % | HEIGHT: 67 IN | WEIGHT: 182.31 LBS | SYSTOLIC BLOOD PRESSURE: 138 MMHG | HEART RATE: 90 BPM | TEMPERATURE: 98 F

## 2022-05-06 DIAGNOSIS — E55.9 VITAMIN D DEFICIENCY: ICD-10-CM

## 2022-05-06 DIAGNOSIS — I10 BENIGN ESSENTIAL HYPERTENSION: ICD-10-CM

## 2022-05-06 DIAGNOSIS — M79.89 LEFT LEG SWELLING: Primary | ICD-10-CM

## 2022-05-06 DIAGNOSIS — I87.2 VENOUS INSUFFICIENCY: ICD-10-CM

## 2022-05-06 DIAGNOSIS — I83.812 VARICOSE VEINS OF LEFT LOWER EXTREMITY WITH PAIN: ICD-10-CM

## 2022-05-06 DIAGNOSIS — Z98.84 HISTORY OF BARIATRIC SURGERY: ICD-10-CM

## 2022-05-06 DIAGNOSIS — E66.3 OVERWEIGHT (BMI 25.0-29.9): ICD-10-CM

## 2022-05-06 DIAGNOSIS — Z96.651 HISTORY OF TOTAL RIGHT KNEE REPLACEMENT: ICD-10-CM

## 2022-05-06 PROCEDURE — 1160F RVW MEDS BY RX/DR IN RCRD: CPT | Mod: CPTII,S$GLB,, | Performed by: FAMILY MEDICINE

## 2022-05-06 PROCEDURE — 1126F PR PAIN SEVERITY QUANTIFIED, NO PAIN PRESENT: ICD-10-PCS | Mod: CPTII,S$GLB,, | Performed by: FAMILY MEDICINE

## 2022-05-06 PROCEDURE — 99214 OFFICE O/P EST MOD 30 MIN: CPT | Mod: S$GLB,,, | Performed by: FAMILY MEDICINE

## 2022-05-06 PROCEDURE — 3075F SYST BP GE 130 - 139MM HG: CPT | Mod: CPTII,S$GLB,, | Performed by: FAMILY MEDICINE

## 2022-05-06 PROCEDURE — 1160F PR REVIEW ALL MEDS BY PRESCRIBER/CLIN PHARMACIST DOCUMENTED: ICD-10-PCS | Mod: CPTII,S$GLB,, | Performed by: FAMILY MEDICINE

## 2022-05-06 PROCEDURE — 3044F PR MOST RECENT HEMOGLOBIN A1C LEVEL <7.0%: ICD-10-PCS | Mod: CPTII,S$GLB,, | Performed by: FAMILY MEDICINE

## 2022-05-06 PROCEDURE — 99214 PR OFFICE/OUTPT VISIT, EST, LEVL IV, 30-39 MIN: ICD-10-PCS | Mod: S$GLB,,, | Performed by: FAMILY MEDICINE

## 2022-05-06 PROCEDURE — 3288F PR FALLS RISK ASSESSMENT DOCUMENTED: ICD-10-PCS | Mod: CPTII,S$GLB,, | Performed by: FAMILY MEDICINE

## 2022-05-06 PROCEDURE — 3079F DIAST BP 80-89 MM HG: CPT | Mod: CPTII,S$GLB,, | Performed by: FAMILY MEDICINE

## 2022-05-06 PROCEDURE — 3288F FALL RISK ASSESSMENT DOCD: CPT | Mod: CPTII,S$GLB,, | Performed by: FAMILY MEDICINE

## 2022-05-06 PROCEDURE — 3044F HG A1C LEVEL LT 7.0%: CPT | Mod: CPTII,S$GLB,, | Performed by: FAMILY MEDICINE

## 2022-05-06 PROCEDURE — 1126F AMNT PAIN NOTED NONE PRSNT: CPT | Mod: CPTII,S$GLB,, | Performed by: FAMILY MEDICINE

## 2022-05-06 PROCEDURE — 1101F PR PT FALLS ASSESS DOC 0-1 FALLS W/OUT INJ PAST YR: ICD-10-PCS | Mod: CPTII,S$GLB,, | Performed by: FAMILY MEDICINE

## 2022-05-06 PROCEDURE — 3008F PR BODY MASS INDEX (BMI) DOCUMENTED: ICD-10-PCS | Mod: CPTII,S$GLB,, | Performed by: FAMILY MEDICINE

## 2022-05-06 PROCEDURE — 3075F PR MOST RECENT SYSTOLIC BLOOD PRESS GE 130-139MM HG: ICD-10-PCS | Mod: CPTII,S$GLB,, | Performed by: FAMILY MEDICINE

## 2022-05-06 PROCEDURE — 99999 PR PBB SHADOW E&M-EST. PATIENT-LVL IV: ICD-10-PCS | Mod: PBBFAC,,, | Performed by: FAMILY MEDICINE

## 2022-05-06 PROCEDURE — 1159F PR MEDICATION LIST DOCUMENTED IN MEDICAL RECORD: ICD-10-PCS | Mod: CPTII,S$GLB,, | Performed by: FAMILY MEDICINE

## 2022-05-06 PROCEDURE — 3008F BODY MASS INDEX DOCD: CPT | Mod: CPTII,S$GLB,, | Performed by: FAMILY MEDICINE

## 2022-05-06 PROCEDURE — 1159F MED LIST DOCD IN RCRD: CPT | Mod: CPTII,S$GLB,, | Performed by: FAMILY MEDICINE

## 2022-05-06 PROCEDURE — 3079F PR MOST RECENT DIASTOLIC BLOOD PRESSURE 80-89 MM HG: ICD-10-PCS | Mod: CPTII,S$GLB,, | Performed by: FAMILY MEDICINE

## 2022-05-06 PROCEDURE — 1101F PT FALLS ASSESS-DOCD LE1/YR: CPT | Mod: CPTII,S$GLB,, | Performed by: FAMILY MEDICINE

## 2022-05-06 PROCEDURE — 99999 PR PBB SHADOW E&M-EST. PATIENT-LVL IV: CPT | Mod: PBBFAC,,, | Performed by: FAMILY MEDICINE

## 2022-05-06 RX ORDER — SEMAGLUTIDE 1 MG/.5ML
INJECTION, SOLUTION SUBCUTANEOUS
Qty: 2 ML | Refills: 11
Start: 2022-05-06 | End: 2023-01-30

## 2022-05-06 RX ORDER — CHOLECALCIFEROL (VITAMIN D3) 125 MCG
5000 CAPSULE ORAL
Qty: 100 CAPSULE | Refills: 4 | Status: SHIPPED | OUTPATIENT
Start: 2022-05-06 | End: 2023-07-11

## 2022-05-06 NOTE — PROGRESS NOTES
Office Visit    Patient Name: Zenia Canela    : 1955  MRN: [id number]    Subjective:  Zenia is a 66 y.o. female who presents today for:    Leg Swelling    65 yo patient of mine seen by me 21 for annual physical and with chronic conditions that include HTN controlled on HCTZ 25 mg daily, ISABELLA stable w/ premarin vaginal & s/p wt loss following gastric sleeve, overweight BMI, vit D deficiency.   Her mom passed away in 2019 related to dementia complications  Has recovered well from a right knee replacement.  She has some scoliosis associated with back pain that was previously evaluated by [facility]-- conservative therapy including PT/core exercises advised.    She presents today to discuss L  leg swelling and recent weight gain.     L leg swelling:  She has a longstanding history of varicose veins, particularly of the left lower extremity, worsened by 1 of her pregnancies.  A long time ago she had some superficial vein treatments that were only mildly effective.  Over the last few months she has noticed increasing distention of the veins of her left lower extremity associated with swelling and occasionally with some redness and warmth particularly with exercise.  She has compression stockings it is not very compliant with wearing them due to how burden 7 they are to get on and how uncomfortable they can be in the hot weather.    Over last few months she had also regained gained about 20 lbs of the lb that she lost following her prior gastric sleeve. Had bariatric labs drawn 5/3/22 that were overall unremarkable including CMP/CBC/ TSH/ Iron Studies/ B12 and electrolyes.   PTH mildly elevated associated with low Vitamin D.     She followed up with her bariatric surgeon and he prescribed Ozmpic 1 mg weekly to combat age related metabolism slowing.  She reports that this is working and she has lost about 10 lb in the last 2 weeks    She is keeping up with a healthy diet and anytime fitness workouts. She  "drinks lemon water regularly.     PAST MEDICAL HISTORY, SURGICAL/SOCIAL/FAMILY HISTORY REVIEWED AS PER CHART, WITH PERTINENT FINDINGS INCLUDED IN HISTORY SECTION OF NOTE.     Current Medications    Medication List with Changes/Refills   New Medications    CHOLECALCIFEROL, VITAMIN D3, 125 MCG (5,000 UNIT) CAPSULE    Take 1 capsule (5,000 Units total) by mouth daily with breakfast.    SEMAGLUTIDE, WEIGHT LOSS, (WEGOVY) 1 MG/0.5 ML PNIJ    Inject 1 mg subcutaneous weekly   Current Medications    HYDROCHLOROTHIAZIDE (HYDRODIURIL) 25 MG TABLET    TAKE 1 TABLET BY MOUTH EVERY DAY    MULTIVITAMIN CAPSULE    Take 1 capsule by mouth once daily.    VARICELLA-ZOSTER GE VAC,2 OF 2 50 MCG SUSR    Administer SHINGRIX shingles vaccine dose 2 of 2 to complete recommended shingles vaccination series secondary to patient's age   Discontinued Medications    CHOLECALCIFEROL, VITAMIN D3, (VITAMIN D3) 50 MCG (2,000 UNIT) TAB    Take 1 tablet (2,000 Units total) by mouth once daily.       Allergies   Review of patient's allergies indicates:   Allergen Reactions    Phenytoin sodium extended      Other reaction(s): jittery         Review of Systems (Pertinent positives)  Review of Systems   Constitutional: Positive for unexpected weight change.   Cardiovascular: Positive for leg swelling.   Gastrointestinal: Negative for constipation, diarrhea, nausea and vomiting.       /83   Pulse 90   Temp 98.1 °F (36.7 °C)   Ht 5' 7" (1.702 m)   Wt 82.7 kg (182 lb 5.1 oz)   LMP  (LMP Unknown)   SpO2 99%   BMI 28.56 kg/m²     Physical Exam  Vitals reviewed.   Constitutional:       General: She is not in acute distress.     Appearance: Normal appearance. She is well-developed.   HENT:      Head: Normocephalic and atraumatic.   Eyes:      Conjunctiva/sclera: Conjunctivae normal.   Cardiovascular:      Rate and Rhythm: Normal rate and regular rhythm.      Pulses: Normal pulses.   Pulmonary:      Effort: Pulmonary effort is normal. "   Musculoskeletal:      Right lower leg: No edema.      Left lower leg: No edema (Nonpitting edema, associated with distended, painful varicose veins.).   Skin:     General: Skin is warm and dry.   Neurological:      General: No focal deficit present.      Mental Status: She is alert and oriented to person, place, and time.   Psychiatric:         Mood and Affect: Mood normal.         Behavior: Behavior normal.           Assessment/Plan:  Zenia Canela is a 66 y.o. female who presents today for :        ICD-10-CM ICD-9-CM    1. Left leg swelling  M79.89 729.81 US Lower Extremity Veins Bilateral Insufficiency   2. Varicose veins of left lower extremity with pain  I83.812 454.8 US Lower Extremity Veins Bilateral Insufficiency   3. Venous insufficiency  I87.2 459.81 US Lower Extremity Veins Bilateral Insufficiency   4. History of total right knee replacement  Z96.651 V43.65    5. History of bariatric surgery  Z98.84 V45.86 cholecalciferol, vitamin D3, 125 mcg (5,000 unit) capsule    gastric sleeve   6. Overweight (BMI 25.0-29.9)  E66.3 278.02 semaglutide, weight loss, (WEGOVY) 1 mg/0.5 mL PnIj   7. Benign essential hypertension  I10 401.1    8. Vitamin D deficiency  E55.9 268.9 cholecalciferol, vitamin D3, 125 mcg (5,000 unit) capsule     66-year-old generally healthy female patient of mine with mild hypertension controlled on low-dose hydrochlorothiazide, history of significant weight loss following gastric sleeve surgery who presents today following recent weight gain and increase in left lower extremity swelling associated with varicose veins.    Her weight is turning the corner with starting Ozempic 1 mg weekly injections as advised by her bariatric surgeon's clinic.  She is not having any GI side effects.  She is keeping up with a healthy diet, hydration, regular exercise.    Bariatric labs did show low vitamin-D with elevated PTH and she was advised to increase her vitamin-D supplementation from 8670-0823 IU of  D3 daily.    Has worsening symptoms from her underlying varicose veins/venous insufficiency, perhaps exacerbated by her recent weight gain.  Now losing weight again, urged improved compliance with elevation throughout the day, compression stockings, and have ordered an ultrasound for venous insufficiency/reflux evaluation.  Depending on results she can follow up with Dr Guillen  to discuss interventional procedures given her degree of discomfort and swelling associated with her varicose veins.    There are no Patient Instructions on file for this visit.      Follow up for to review results of diagnostic procedure, return as needed for new concerns.

## 2022-05-07 LAB — VIT B1 BLD-MCNC: 66 UG/L (ref 38–122)

## 2022-06-09 ENCOUNTER — HOSPITAL ENCOUNTER (OUTPATIENT)
Dept: RADIOLOGY | Facility: HOSPITAL | Age: 67
Discharge: HOME | End: 2022-06-09
Attending: FAMILY MEDICINE
Payer: MEDICARE

## 2022-06-09 ENCOUNTER — TELEPHONE (OUTPATIENT)
Dept: FAMILY MEDICINE | Facility: CLINIC | Age: 67
End: 2022-06-09
Payer: MEDICARE

## 2022-06-09 DIAGNOSIS — I80.02 THROMBOPHLEBITIS OF SUPERFICIAL VEINS OF LEFT LOWER EXTREMITY: Primary | ICD-10-CM

## 2022-06-09 DIAGNOSIS — M79.89 LEFT LEG SWELLING: ICD-10-CM

## 2022-06-09 DIAGNOSIS — I87.2 VENOUS INSUFFICIENCY: ICD-10-CM

## 2022-06-09 DIAGNOSIS — I87.8 LOWER EXTREMITY VENOUS STASIS: ICD-10-CM

## 2022-06-09 DIAGNOSIS — I80.02 THROMBOPHLEBITIS OF SUPERFICIAL VEINS OF LEFT LOWER EXTREMITY: ICD-10-CM

## 2022-06-09 DIAGNOSIS — I87.2 VENOUS REFLUX: Primary | ICD-10-CM

## 2022-06-09 DIAGNOSIS — I83.812 VARICOSE VEINS OF LEG WITH PAIN, LEFT: ICD-10-CM

## 2022-06-09 DIAGNOSIS — I83.812 VARICOSE VEINS OF LEFT LOWER EXTREMITY WITH PAIN: ICD-10-CM

## 2022-06-09 PROCEDURE — 93970 EXTREMITY STUDY: CPT | Mod: TC

## 2022-06-09 PROCEDURE — 93970 EXTREMITY STUDY: CPT | Mod: 26,,, | Performed by: RADIOLOGY

## 2022-06-09 PROCEDURE — 93970 US LOWER EXTREMITY VEINS BILATERAL INSUFFICIENCY: ICD-10-PCS | Mod: 26,,, | Performed by: RADIOLOGY

## 2022-06-09 RX ORDER — ASPIRIN 325 MG
325 TABLET ORAL DAILY
Qty: 90 TABLET | Refills: 0
Start: 2022-06-09 | End: 2023-01-30

## 2022-06-09 NOTE — TELEPHONE ENCOUNTER
----- Message from Vianney Downs MD sent at 6/9/2022 12:27 PM CDT -----  Venous ultrasound shows significant venous reflux and superficial clot as cause of the intermittent redness and warmth. Referral placed to Vascular Dr Guillen. ADVISE 325 mg ASPIRIN daily with breakfast until seen by Dr Guillen-- please ensure patient gets this message and is aware of the pending referral thanks.   PS Ok to continue exercising once the aspirin is started if the pain, redness and warmth are not getting worse, dr Downs

## 2022-07-11 ENCOUNTER — HOSPITAL ENCOUNTER (OUTPATIENT)
Dept: RADIOLOGY | Facility: HOSPITAL | Age: 67
Discharge: HOME | End: 2022-07-11
Attending: FAMILY MEDICINE
Payer: MEDICARE

## 2022-07-11 ENCOUNTER — OFFICE VISIT (OUTPATIENT)
Dept: CARDIOLOGY | Facility: CLINIC | Age: 67
End: 2022-07-11
Payer: MEDICARE

## 2022-07-11 VITALS
DIASTOLIC BLOOD PRESSURE: 78 MMHG | SYSTOLIC BLOOD PRESSURE: 126 MMHG | HEIGHT: 65 IN | BODY MASS INDEX: 28.99 KG/M2 | WEIGHT: 174 LBS

## 2022-07-11 DIAGNOSIS — Z12.31 ENCOUNTER FOR SCREENING MAMMOGRAM FOR BREAST CANCER: ICD-10-CM

## 2022-07-11 DIAGNOSIS — I10 BENIGN ESSENTIAL HYPERTENSION: ICD-10-CM

## 2022-07-11 DIAGNOSIS — I80.02 THROMBOPHLEBITIS OF SUPERFICIAL VEINS OF LEFT LOWER EXTREMITY: ICD-10-CM

## 2022-07-11 DIAGNOSIS — I87.2 VENOUS REFLUX: Primary | ICD-10-CM

## 2022-07-11 DIAGNOSIS — I83.812 VARICOSE VEINS OF LEG WITH PAIN, LEFT: ICD-10-CM

## 2022-07-11 DIAGNOSIS — Z78.0 POSTMENOPAUSAL: ICD-10-CM

## 2022-07-11 DIAGNOSIS — E66.3 OVERWEIGHT (BMI 25.0-29.9): ICD-10-CM

## 2022-07-11 DIAGNOSIS — Z98.84 HISTORY OF BARIATRIC SURGERY: ICD-10-CM

## 2022-07-11 PROCEDURE — 99205 PR OFFICE/OUTPT VISIT, NEW, LEVL V, 60-74 MIN: ICD-10-PCS | Mod: S$GLB,,, | Performed by: INTERNAL MEDICINE

## 2022-07-11 PROCEDURE — 3078F DIAST BP <80 MM HG: CPT | Mod: CPTII,S$GLB,, | Performed by: INTERNAL MEDICINE

## 2022-07-11 PROCEDURE — 77080 DXA BONE DENSITY AXIAL: CPT | Mod: 26,,, | Performed by: RADIOLOGY

## 2022-07-11 PROCEDURE — 99205 OFFICE O/P NEW HI 60 MIN: CPT | Mod: S$GLB,,, | Performed by: INTERNAL MEDICINE

## 2022-07-11 PROCEDURE — 1126F AMNT PAIN NOTED NONE PRSNT: CPT | Mod: CPTII,S$GLB,, | Performed by: INTERNAL MEDICINE

## 2022-07-11 PROCEDURE — 77080 DEXA BONE DENSITY SPINE HIP: ICD-10-PCS | Mod: 26,,, | Performed by: RADIOLOGY

## 2022-07-11 PROCEDURE — 3074F PR MOST RECENT SYSTOLIC BLOOD PRESSURE < 130 MM HG: ICD-10-PCS | Mod: CPTII,S$GLB,, | Performed by: INTERNAL MEDICINE

## 2022-07-11 PROCEDURE — 77067 SCR MAMMO BI INCL CAD: CPT | Mod: 26,,, | Performed by: RADIOLOGY

## 2022-07-11 PROCEDURE — 77080 DXA BONE DENSITY AXIAL: CPT | Mod: TC

## 2022-07-11 PROCEDURE — 3044F HG A1C LEVEL LT 7.0%: CPT | Mod: CPTII,S$GLB,, | Performed by: INTERNAL MEDICINE

## 2022-07-11 PROCEDURE — 3078F PR MOST RECENT DIASTOLIC BLOOD PRESSURE < 80 MM HG: ICD-10-PCS | Mod: CPTII,S$GLB,, | Performed by: INTERNAL MEDICINE

## 2022-07-11 PROCEDURE — 1159F PR MEDICATION LIST DOCUMENTED IN MEDICAL RECORD: ICD-10-PCS | Mod: CPTII,S$GLB,, | Performed by: INTERNAL MEDICINE

## 2022-07-11 PROCEDURE — 99999 PR PBB SHADOW E&M-EST. PATIENT-LVL III: CPT | Mod: PBBFAC,,, | Performed by: INTERNAL MEDICINE

## 2022-07-11 PROCEDURE — 99999 PR PBB SHADOW E&M-EST. PATIENT-LVL III: ICD-10-PCS | Mod: PBBFAC,,, | Performed by: INTERNAL MEDICINE

## 2022-07-11 PROCEDURE — 3044F PR MOST RECENT HEMOGLOBIN A1C LEVEL <7.0%: ICD-10-PCS | Mod: CPTII,S$GLB,, | Performed by: INTERNAL MEDICINE

## 2022-07-11 PROCEDURE — 3074F SYST BP LT 130 MM HG: CPT | Mod: CPTII,S$GLB,, | Performed by: INTERNAL MEDICINE

## 2022-07-11 PROCEDURE — 77063 MAMMO DIGITAL SCREENING BILAT WITH TOMO: ICD-10-PCS | Mod: 26,,, | Performed by: RADIOLOGY

## 2022-07-11 PROCEDURE — 3008F PR BODY MASS INDEX (BMI) DOCUMENTED: ICD-10-PCS | Mod: CPTII,S$GLB,, | Performed by: INTERNAL MEDICINE

## 2022-07-11 PROCEDURE — 3008F BODY MASS INDEX DOCD: CPT | Mod: CPTII,S$GLB,, | Performed by: INTERNAL MEDICINE

## 2022-07-11 PROCEDURE — 77063 BREAST TOMOSYNTHESIS BI: CPT | Mod: 26,,, | Performed by: RADIOLOGY

## 2022-07-11 PROCEDURE — 77067 SCR MAMMO BI INCL CAD: CPT | Mod: TC

## 2022-07-11 PROCEDURE — 1159F MED LIST DOCD IN RCRD: CPT | Mod: CPTII,S$GLB,, | Performed by: INTERNAL MEDICINE

## 2022-07-11 PROCEDURE — 77067 MAMMO DIGITAL SCREENING BILAT WITH TOMO: ICD-10-PCS | Mod: 26,,, | Performed by: RADIOLOGY

## 2022-07-11 PROCEDURE — 1126F PR PAIN SEVERITY QUANTIFIED, NO PAIN PRESENT: ICD-10-PCS | Mod: CPTII,S$GLB,, | Performed by: INTERNAL MEDICINE

## 2022-07-11 NOTE — PROGRESS NOTES
Subjective:   Patient ID:  Zenia Canela is a 67 y.o. 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 She has a past medical history of left ankle varicosity sclerotherapy in , no previous DVT, complicated pregnancy with her last child 34 years ago with severe compression of left lower abdominal quadrant.  Aspirin 325 mg daily was recently started for superficial phlebitis which has resolved. She is a 10 year cardiovascular risk less than 7.5%      Venous US 2022      R GSV 2 mm - reflux   R LSV 3 mm - reflux   R POP + reflux > 500 msec a     L GSV 5 mm + reflux > 500 msec    L LSV 3 mm - reflux      No DVT             Patient Active Problem List    Diagnosis Date Noted    Osteopenia of both hips 2022    Thrombophlebitis of superficial veins of left lower extremity 2022    Varicose veins of leg with pain, left 2022    Lower extremity venous stasis 2022    Bilateral primary osteoarthritis of knee 2021    History of total right knee replacement 2021    Impaired mobility 2021    History of bariatric surgery 2017    Vitamin D deficiency 2016    SI (stress incontinence), female 10/15/2015     kegal exercises, weight loss, estrogen cream.       Overweight (BMI 25.0-29.9) 10/15/2015    Benign essential hypertension 2012    Venous reflux 2012           Right Arm BP - Sittin/77  Left Arm BP - Sittin/78        LABS      LAST HbA1c  Lab Results   Component Value Date    HGBA1C 5.1 2022       Lipid panel  Lab Results   Component Value Date    CHOL 196 2022    CHOL 180 2021    CHOL 223 (H)  06/04/2019     Lab Results   Component Value Date    HDL 87 (H) 05/03/2022    HDL 84 (H) 07/07/2021    HDL 98 (H) 06/04/2019     Lab Results   Component Value Date    LDLCALC 102.0 05/03/2022    LDLCALC 85.8 07/07/2021    LDLCALC 112.6 06/04/2019     Lab Results   Component Value Date    TRIG 35 05/03/2022    TRIG 51 07/07/2021    TRIG 62 06/04/2019     Lab Results   Component Value Date    CHOLHDL 44.4 05/03/2022    CHOLHDL 46.7 07/07/2021    CHOLHDL 43.9 06/04/2019            Review of Systems   Constitutional: Negative for diaphoresis, night sweats, weight gain and weight loss.   HENT: Negative for congestion.    Eyes: Negative for blurred vision, discharge and double vision.   Cardiovascular: Positive for leg swelling (L >> R ). Negative for chest pain, claudication, cyanosis, dyspnea on exertion, irregular heartbeat, near-syncope, orthopnea, palpitations, paroxysmal nocturnal dyspnea and syncope.   Respiratory: Negative for cough, shortness of breath and wheezing.    Endocrine: Negative for cold intolerance, heat intolerance and polyphagia.   Hematologic/Lymphatic: Negative for adenopathy and bleeding problem. Does not bruise/bleed easily.   Skin: Negative for dry skin and nail changes.   Musculoskeletal: Negative for arthritis, back pain, falls, joint pain, myalgias and neck pain.   Gastrointestinal: Negative for bloating, abdominal pain, change in bowel habit and constipation.   Genitourinary: Negative for bladder incontinence, dysuria, flank pain, genital sores and missed menses.   Neurological: Negative for aphonia, brief paralysis, difficulty with concentration, dizziness and weakness.   Psychiatric/Behavioral: Negative for altered mental status and memory loss. The patient does not have insomnia.    Allergic/Immunologic: Negative for environmental allergies.       Objective:   Physical Exam  Constitutional:       Appearance: She is well-developed.      Interventions: She is not intubated.  HENT:       Head: Normocephalic and atraumatic.      Right Ear: External ear normal.      Left Ear: External ear normal.   Eyes:      General: No scleral icterus.        Right eye: No discharge.         Left eye: No discharge.      Conjunctiva/sclera: Conjunctivae normal.      Pupils: Pupils are equal, round, and reactive to light.   Neck:      Thyroid: No thyromegaly.      Vascular: Normal carotid pulses. No carotid bruit, hepatojugular reflux or JVD.      Trachea: No tracheal deviation.   Cardiovascular:      Rate and Rhythm: Normal rate and regular rhythm.  No extrasystoles are present.     Chest Wall: PMI is not displaced.      Pulses:           Carotid pulses are 2+ on the right side and 2+ on the left side.       Radial pulses are 2+ on the right side and 2+ on the left side.        Femoral pulses are 2+ on the right side and 2+ on the left side.       Popliteal pulses are 2+ on the right side and 2+ on the left side.        Dorsalis pedis pulses are 2+ on the right side and 2+ on the left side.        Posterior tibial pulses are 2+ on the right side and 2+ on the left side.      Heart sounds: S1 normal and S2 normal. Heart sounds not distant. No midsystolic click. No murmur heard.    No friction rub. No gallop. No S3 sounds.   Pulmonary:      Effort: Pulmonary effort is normal. No tachypnea, bradypnea, accessory muscle usage or respiratory distress. She is not intubated.      Breath sounds: Normal breath sounds. No stridor. No decreased breath sounds, wheezing or rales.   Chest:      Chest wall: No tenderness.   Abdominal:      General: There is no distension or abdominal bruit.      Palpations: There is no mass or pulsatile mass.      Tenderness: There is no abdominal tenderness. There is no guarding or rebound.   Musculoskeletal:         General: No tenderness. Normal range of motion.      Cervical back: Normal range of motion and neck supple.   Lymphadenopathy:      Cervical: No cervical adenopathy.      Comments:      Left leg edema and mildly larger than right leg         Skin:     General: Skin is warm.      Coloration: Skin is not pale.      Findings: No erythema or rash.      Comments:     Engorged varicose veins         No ulcers           Neurological:      Mental Status: She is alert and oriented to person, place, and time.      Cranial Nerves: No cranial nerve deficit.      Coordination: Coordination normal.      Deep Tendon Reflexes: Reflexes are normal and symmetric.   Psychiatric:         Behavior: Behavior normal.         Thought Content: Thought content normal.         Judgment: Judgment normal.         Assessment:     1. Venous reflux    2. Varicose veins of leg with pain, left    3. Thrombophlebitis of superficial veins of left lower extremity    4. Benign essential hypertension    5. Overweight (BMI 25.0-29.9)    6. History of bariatric surgery        Plan:         Continue with compression stockings 20-30 mmHg.  Exercise.  If there is no improvement she will return for left greater saphenous vein chemical ablation.  She will also benefit from Varithena treatment of her extensive varicosities.  Her presentation is also concerning for deep venous reflux disease as noted in the right popliteal vein on ultrasound (6/2022) and her history of chronic left lower extremity edema since her last pregnancy 34 years ago.      Continue with current medical plan and lifestyle changes.  Return sooner for concerns or questions. If symptoms persist go to the ED  I have reviewed all pertinent data on this patient       I have reviewed the patient's medical history in detail and updated the computerized patient record.    Orders Placed This Encounter   Procedures    COMPRESSION STOCKINGS     Knee high     Order Specific Question:   Pressure amount:     Answer:   20-30 mmHg       Follow up as scheduled. Return sooner for concerns or questions            She expressed verbal understanding and agreed with the  plan        Patient's Medications   New Prescriptions    No medications on file   Previous Medications    ASPIRIN 325 MG TABLET    Take 1 tablet (325 mg total) by mouth once daily.    CHOLECALCIFEROL, VITAMIN D3, 125 MCG (5,000 UNIT) CAPSULE    Take 1 capsule (5,000 Units total) by mouth daily with breakfast.    HYDROCHLOROTHIAZIDE (HYDRODIURIL) 25 MG TABLET    TAKE 1 TABLET BY MOUTH EVERY DAY    MULTIVITAMIN CAPSULE    Take 1 capsule by mouth once daily.    SEMAGLUTIDE, WEIGHT LOSS, (WEGOVY) 1 MG/0.5 ML PNIJ    Inject 1 mg subcutaneous weekly    VARICELLA-ZOSTER GE VAC,2 OF 2 50 MCG SUSR    Administer SHINGRIX shingles vaccine dose 2 of 2 to complete recommended shingles vaccination series secondary to patient's age   Modified Medications    No medications on file   Discontinued Medications    No medications on file

## 2022-07-12 PROBLEM — M85.852 OSTEOPENIA OF BOTH HIPS: Status: ACTIVE | Noted: 2022-07-12

## 2022-07-12 PROBLEM — M85.851 OSTEOPENIA OF BOTH HIPS: Status: ACTIVE | Noted: 2022-07-12

## 2022-08-22 PROBLEM — M25.511 RIGHT SHOULDER PAIN: Status: ACTIVE | Noted: 2022-08-22

## 2022-08-22 PROBLEM — Z78.9 IMPAIRED MOBILITY AND ADLS: Status: ACTIVE | Noted: 2022-08-22

## 2022-08-22 PROBLEM — Z74.09 IMPAIRED MOBILITY AND ADLS: Status: ACTIVE | Noted: 2022-08-22

## 2022-08-22 PROBLEM — M75.41 IMPINGEMENT SYNDROME OF RIGHT SHOULDER: Status: ACTIVE | Noted: 2022-08-22

## 2022-08-22 PROBLEM — M25.611 DECREASED RIGHT SHOULDER RANGE OF MOTION: Status: ACTIVE | Noted: 2022-08-22

## 2022-12-30 DIAGNOSIS — I10 BENIGN ESSENTIAL HYPERTENSION: ICD-10-CM

## 2022-12-30 RX ORDER — HYDROCHLOROTHIAZIDE 25 MG/1
TABLET ORAL
Qty: 90 TABLET | Refills: 1 | Status: SHIPPED | OUTPATIENT
Start: 2022-12-30 | End: 2023-05-26

## 2022-12-30 NOTE — TELEPHONE ENCOUNTER
Care Due:                  Date            Visit Type   Department     Provider  --------------------------------------------------------------------------------                                EP -                              PRIMARY      [facility] FAMILY  Last Visit: 05-      CARE ([facility])   MEDICINE       Vianney Downs  Next Visit: None Scheduled  None         None Found                                                            Last  Test          Frequency    Reason                     Performed    Due Date  --------------------------------------------------------------------------------    HBA1C.......  6 months...  semaglutide,.............  05-   10-    [facility] Embedded Care Gaps. Reference number: [id number]. 12/30/2022   11:27:52 AM CST

## 2022-12-30 NOTE — TELEPHONE ENCOUNTER
Refill Decision Note   Zenia Canela  is requesting a refill authorization.  Brief Assessment and Rationale for Refill:  Approve    -Medication-Related Problems Identified: Requires labs  Medication Therapy Plan:       Medication Reconciliation Completed: No   Comments:     Provider Staff:     Action is required for this patient.   Please see care gap opportunities below in Care Due Message.     Thanks!  [facility]     Appointments      Date Provider   Last Visit   5/6/2022 Vianney Downs MD   Next Visit   Visit date not found Vianney Downs MD     Note composed:2:59 PM 12/30/2022           Note composed:2:59 PM 12/30/2022

## 2023-01-19 ENCOUNTER — PATIENT MESSAGE (OUTPATIENT)
Dept: ADMINISTRATIVE | Facility: HOSPITAL | Age: 68
End: 2023-01-19
Payer: MEDICARE

## 2023-01-30 ENCOUNTER — OFFICE VISIT (OUTPATIENT)
Dept: CARDIOLOGY | Facility: CLINIC | Age: 68
End: 2023-01-30
Payer: MEDICARE

## 2023-01-30 VITALS
DIASTOLIC BLOOD PRESSURE: 82 MMHG | SYSTOLIC BLOOD PRESSURE: 128 MMHG | WEIGHT: 158 LBS | HEIGHT: 67 IN | HEART RATE: 69 BPM | BODY MASS INDEX: 24.8 KG/M2

## 2023-01-30 DIAGNOSIS — I87.2 VENOUS REFLUX: Primary | ICD-10-CM

## 2023-01-30 DIAGNOSIS — I10 BENIGN ESSENTIAL HYPERTENSION: ICD-10-CM

## 2023-01-30 DIAGNOSIS — I83.812 VARICOSE VEINS OF LEG WITH PAIN, LEFT: ICD-10-CM

## 2023-01-30 DIAGNOSIS — I80.02 THROMBOPHLEBITIS OF SUPERFICIAL VEINS OF LEFT LOWER EXTREMITY: ICD-10-CM

## 2023-01-30 DIAGNOSIS — I87.8 LOWER EXTREMITY VENOUS STASIS: ICD-10-CM

## 2023-01-30 PROCEDURE — 3074F SYST BP LT 130 MM HG: CPT | Mod: CPTII,S$GLB,, | Performed by: INTERNAL MEDICINE

## 2023-01-30 PROCEDURE — 3074F PR MOST RECENT SYSTOLIC BLOOD PRESSURE < 130 MM HG: ICD-10-PCS | Mod: CPTII,S$GLB,, | Performed by: INTERNAL MEDICINE

## 2023-01-30 PROCEDURE — 3079F DIAST BP 80-89 MM HG: CPT | Mod: CPTII,S$GLB,, | Performed by: INTERNAL MEDICINE

## 2023-01-30 PROCEDURE — 1159F MED LIST DOCD IN RCRD: CPT | Mod: CPTII,S$GLB,, | Performed by: INTERNAL MEDICINE

## 2023-01-30 PROCEDURE — 3288F FALL RISK ASSESSMENT DOCD: CPT | Mod: CPTII,S$GLB,, | Performed by: INTERNAL MEDICINE

## 2023-01-30 PROCEDURE — 1101F PR PT FALLS ASSESS DOC 0-1 FALLS W/OUT INJ PAST YR: ICD-10-PCS | Mod: CPTII,S$GLB,, | Performed by: INTERNAL MEDICINE

## 2023-01-30 PROCEDURE — 99215 PR OFFICE/OUTPT VISIT, EST, LEVL V, 40-54 MIN: ICD-10-PCS | Mod: S$GLB,,, | Performed by: INTERNAL MEDICINE

## 2023-01-30 PROCEDURE — 3008F PR BODY MASS INDEX (BMI) DOCUMENTED: ICD-10-PCS | Mod: CPTII,S$GLB,, | Performed by: INTERNAL MEDICINE

## 2023-01-30 PROCEDURE — 3288F PR FALLS RISK ASSESSMENT DOCUMENTED: ICD-10-PCS | Mod: CPTII,S$GLB,, | Performed by: INTERNAL MEDICINE

## 2023-01-30 PROCEDURE — 99999 PR PBB SHADOW E&M-EST. PATIENT-LVL III: CPT | Mod: PBBFAC,,, | Performed by: INTERNAL MEDICINE

## 2023-01-30 PROCEDURE — 99215 OFFICE O/P EST HI 40 MIN: CPT | Mod: S$GLB,,, | Performed by: INTERNAL MEDICINE

## 2023-01-30 PROCEDURE — 3008F BODY MASS INDEX DOCD: CPT | Mod: CPTII,S$GLB,, | Performed by: INTERNAL MEDICINE

## 2023-01-30 PROCEDURE — 3079F PR MOST RECENT DIASTOLIC BLOOD PRESSURE 80-89 MM HG: ICD-10-PCS | Mod: CPTII,S$GLB,, | Performed by: INTERNAL MEDICINE

## 2023-01-30 PROCEDURE — 1159F PR MEDICATION LIST DOCUMENTED IN MEDICAL RECORD: ICD-10-PCS | Mod: CPTII,S$GLB,, | Performed by: INTERNAL MEDICINE

## 2023-01-30 PROCEDURE — 1126F AMNT PAIN NOTED NONE PRSNT: CPT | Mod: CPTII,S$GLB,, | Performed by: INTERNAL MEDICINE

## 2023-01-30 PROCEDURE — 99999 PR PBB SHADOW E&M-EST. PATIENT-LVL III: ICD-10-PCS | Mod: PBBFAC,,, | Performed by: INTERNAL MEDICINE

## 2023-01-30 PROCEDURE — 1126F PR PAIN SEVERITY QUANTIFIED, NO PAIN PRESENT: ICD-10-PCS | Mod: CPTII,S$GLB,, | Performed by: INTERNAL MEDICINE

## 2023-01-30 PROCEDURE — 1101F PT FALLS ASSESS-DOCD LE1/YR: CPT | Mod: CPTII,S$GLB,, | Performed by: INTERNAL MEDICINE

## 2023-01-30 RX ORDER — TIRZEPATIDE 5 MG/.5ML
7.5 INJECTION, SOLUTION SUBCUTANEOUS WEEKLY
COMMUNITY
Start: 2022-08-31 | End: 2023-05-11

## 2023-01-30 NOTE — PROGRESS NOTES
Subjective:   Patient ID:  Zenia Canela is a 67 y.o. 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  Her symptoms have persisted despite religiously wearing compression stockings 20-30 mm of mercury, exercising, and weight maintenance.       She has a past medical history of left ankle varicosity sclerotherapy in 2012, no previous DVT, complicated pregnancy with her last child 34 years ago with severe compression of left lower abdominal quadrant.  Aspirin 325 mg daily was recently started for superficial phlebitis which has resolved. She is a 10 year cardiovascular risk less than 7.5%      Venous US 6/2022      R GSV 2 mm - reflux   R LSV 3 mm - reflux   R POP + reflux > 500 msec a     L GSV 5 mm + reflux > 500 msec    L LSV 3 mm - reflux      No DVT       CEAP 4c  VCSS 11          Patient Active Problem List    Diagnosis Date Noted    Impingement syndrome of right shoulder 08/22/2022    Right shoulder pain 08/22/2022    Decreased right shoulder range of motion 08/22/2022    Impaired mobility and ADLs 08/22/2022    Osteopenia of both hips 07/12/2022    Thrombophlebitis of superficial veins of left lower extremity 06/09/2022    Varicose veins of leg with pain, left 06/09/2022    Lower extremity venous stasis 06/09/2022    Bilateral primary osteoarthritis of knee 07/12/2021    History of total right knee replacement 07/12/2021    Impaired mobility 06/22/2021    History of bariatric surgery 09/25/2017    Vitamin D deficiency 08/07/2016    SI (stress incontinence), female 10/15/2015     kegal exercises, weight loss, estrogen cream.       Overweight (BMI 25.0-29.9) 10/15/2015    Benign essential  hypertension 2012    Venous reflux 2012           Right Arm BP - Sittin/82  Left Arm BP - Sittin/82        LABS      LAST HbA1c  Lab Results   Component Value Date    HGBA1C 5.1 2022       Lipid panel  Lab Results   Component Value Date    CHOL 196 2022    CHOL 180 2021    CHOL 223 (H) 2019     Lab Results   Component Value Date    HDL 87 (H) 2022    HDL 84 (H) 2021    HDL 98 (H) 2019     Lab Results   Component Value Date    LDLCALC 102.0 2022    LDLCALC 85.8 2021    LDLCALC 112.6 2019     Lab Results   Component Value Date    TRIG 35 2022    TRIG 51 2021    TRIG 62 2019     Lab Results   Component Value Date    CHOLHDL 44.4 2022    CHOLHDL 46.7 2021    CHOLHDL 43.9 2019            Review of Systems   Constitutional: Negative for diaphoresis, night sweats, weight gain and weight loss.   HENT:  Negative for congestion.    Eyes:  Negative for blurred vision, discharge and double vision.   Cardiovascular:  Positive for leg swelling (L >> R ). Negative for chest pain, claudication, cyanosis, dyspnea on exertion, irregular heartbeat, near-syncope, orthopnea, palpitations, paroxysmal nocturnal dyspnea and syncope.   Respiratory:  Negative for cough, shortness of breath and wheezing.    Endocrine: Negative for cold intolerance, heat intolerance and polyphagia.   Hematologic/Lymphatic: Negative for adenopathy and bleeding problem. Does not bruise/bleed easily.   Skin:  Negative for dry skin and nail changes.   Musculoskeletal:  Negative for arthritis, back pain, falls, joint pain, myalgias and neck pain.   Gastrointestinal:  Negative for bloating, abdominal pain, change in bowel habit and constipation.   Genitourinary:  Negative for bladder incontinence, dysuria, flank pain, genital sores and missed menses.   Neurological:  Negative for aphonia, brief paralysis, difficulty with concentration, dizziness  and weakness.   Psychiatric/Behavioral:  Negative for altered mental status and memory loss. The patient does not have insomnia.    Allergic/Immunologic: Negative for environmental allergies.     Objective:   Physical Exam  Constitutional:       Appearance: She is well-developed.      Interventions: She is not intubated.  HENT:      Head: Normocephalic and atraumatic.      Right Ear: External ear normal.      Left Ear: External ear normal.   Eyes:      General: No scleral icterus.        Right eye: No discharge.         Left eye: No discharge.      Conjunctiva/sclera: Conjunctivae normal.      Pupils: Pupils are equal, round, and reactive to light.   Neck:      Thyroid: No thyromegaly.      Vascular: Normal carotid pulses. No carotid bruit, hepatojugular reflux or JVD.      Trachea: No tracheal deviation.   Cardiovascular:      Rate and Rhythm: Normal rate and regular rhythm. No extrasystoles are present.     Chest Wall: PMI is not displaced.      Pulses:           Carotid pulses are 2+ on the right side and 2+ on the left side.       Radial pulses are 2+ on the right side and 2+ on the left side.        Femoral pulses are 2+ on the right side and 2+ on the left side.       Popliteal pulses are 2+ on the right side and 2+ on the left side.        Dorsalis pedis pulses are 2+ on the right side and 2+ on the left side.        Posterior tibial pulses are 2+ on the right side and 2+ on the left side.      Heart sounds: S1 normal and S2 normal. Heart sounds not distant. No midsystolic click. No murmur heard.    No friction rub. No gallop. No S3 sounds.   Pulmonary:      Effort: Pulmonary effort is normal. No tachypnea, bradypnea, accessory muscle usage or respiratory distress. She is not intubated.      Breath sounds: Normal breath sounds. No stridor. No decreased breath sounds, wheezing or rales.   Chest:      Chest wall: No tenderness.   Abdominal:      General: There is no distension or abdominal bruit.       Palpations: There is no mass or pulsatile mass.      Tenderness: There is no abdominal tenderness. There is no guarding or rebound.   Musculoskeletal:         General: No tenderness. Normal range of motion.      Cervical back: Normal range of motion and neck supple.   Lymphadenopathy:      Cervical: No cervical adenopathy.      Comments:     Left leg edema and mildly larger than right leg         Skin:     General: Skin is warm.      Coloration: Skin is not pale.      Findings: No erythema or rash.      Comments:     Engorged varicose veins         No ulcers           Neurological:      Mental Status: She is alert and oriented to person, place, and time.      Cranial Nerves: No cranial nerve deficit.      Coordination: Coordination normal.      Deep Tendon Reflexes: Reflexes are normal and symmetric.   Psychiatric:         Behavior: Behavior normal.         Thought Content: Thought content normal.         Judgment: Judgment normal.       Assessment:     1. Venous reflux    2. Varicose veins of leg with pain, left    3. Benign essential hypertension    4. Lower extremity venous stasis    5. Thrombophlebitis of superficial veins of left lower extremity          Plan:       She will proceed with left greater saphenous vein ablation with Varithena.  She has been religiously wearing compression stockings unfortunately his symptoms have persisted.  She is active.  She is been exercising without benefit from conservative measures.  Orders were placed today.  She will be called with the date and time for the procedure.  If there is no improvement after superficial vein intervention we will consider deep venous evaluation and intervention.  Her presentation is concerning for bilateral iliac vein compression. She has deep venous reflux disease as noted in the right popliteal vein on ultrasound (6/2022) and her history of chronic left lower extremity edema since her last pregnancy 34 years ago.        Continue with compression  stockings 20-30 mmHg.  Exercise.          Continue with current medical plan and lifestyle changes.  Return sooner for concerns or questions. If symptoms persist go to the ED  I have reviewed all pertinent data on this patient       I have reviewed the patient's medical history in detail and updated the computerized patient record.    Orders Placed This Encounter   Procedures    Case Request-Cath Lab: Ablation     Standing Status:   Standing     Number of Occurrences:   1     Order Specific Question:   CPT Code:     Answer:   HI ENDOVENOUS ABLATION THER CHEM ADHESIVE, 1ST VEIN [29142]     Order Specific Question:   CPT Code:     Answer:   HI INJ, NONCMPND FOAM SCLEROSANT, 1 VEIN [48848]     Order Specific Question:   CPT Code:     Answer:   HI  US GUIDE, VASCULAR ACCESS [15767]     Order Specific Question:   Medical Necessity:     Answer:   Medically Urgent [101]     Order Specific Question:   Is an on-site pathologist required for this procedure?     Answer:   N/A       Follow up as scheduled. Return sooner for concerns or questions            She expressed verbal understanding and agreed with the plan        -In today's visit, at least 4 established conditions that pose a risk to life or bodily function have been addressed and the conditions are severe.    -In today's visit, monitoring for drug toxicity was accomplished.      Patient's Medications   New Prescriptions    No medications on file   Previous Medications    CHOLECALCIFEROL, VITAMIN D3, 125 MCG (5,000 UNIT) CAPSULE    Take 1 capsule (5,000 Units total) by mouth daily with breakfast.    HYDROCHLOROTHIAZIDE (HYDRODIURIL) 25 MG TABLET    TAKE 1 TABLET BY MOUTH EVERY DAY    MOUNJARO 5 MG/0.5 ML PNIJ    INJECT 5 MG UNDER SKIN ONCE WEEKLY    MULTIVITAMIN CAPSULE    Take 1 capsule by mouth once daily.    VARICELLA-ZOSTER GE VAC,2 OF 2 50 MCG SUSR    Administer SHINGRIX shingles vaccine dose 2 of 2 to complete recommended shingles vaccination series secondary to  patient's age   Modified Medications    No medications on file   Discontinued Medications

## 2023-02-03 ENCOUNTER — TELEPHONE (OUTPATIENT)
Dept: CARDIOLOGY | Facility: CLINIC | Age: 68
End: 2023-02-03
Payer: MEDICARE

## 2023-02-03 NOTE — TELEPHONE ENCOUNTER
I reached out to patient and we corrected    Her medication MOUNJARO in the chart.      Nw                          Nuzhat COSTELLO Staff  Caller: Unspecified (Today,  8:51 AM)  Type:  Needs Medical Advice     Who Called:  Pt   Pharmacy name and phone #:  [facility] in [facility]  #[phone]   Phone: [phone]   Would the patient rather a call back or a response via [facility]?  call   Best Call Back Number: [phone]   Additional Information:  Pt states that they cancelled the wrong  medication. MOUNJARO 5 mg/0.5 mL PnIj [[id number]] is what she previously had but she is taking MONJARO 7.5 now and pharmacy states that De. Cancelled it.  Pt would like a call back from nurse to verify why it was cancelled.

## 2023-02-24 ENCOUNTER — TELEPHONE (OUTPATIENT)
Dept: CARDIOLOGY | Facility: CLINIC | Age: 68
End: 2023-02-24
Payer: MEDICARE

## 2023-02-24 NOTE — TELEPHONE ENCOUNTER
MD Britni Ivy MA; Dae Parker, RN  Caller: Unspecified (3 weeks ago)  Dae   Can you check with her and answer her questions       ZN           Previous Messages       ----- Message -----   From: Britni Hill MA   Sent: 2/3/2023   4:11 PM CST   To: David Guillen MD, Dae Parker RN       ----- Message -----   From: Lazara Chaparro   Sent: 2/2/2023   2:22 PM CST   To: Wilmer COSTELLO Staff     Type:  Needs Medical Advice     Who Called:  pt   Symptoms (please be specific):     How long has patient had these symptoms:     Pharmacy name and phone #:     Would the patient rather a call back or a response via [facility]?   Best Call Back Number: [phone] (M)   Additional Information: wants to speak to the office and check the status on procedure she needs to schedule

## 2023-05-10 ENCOUNTER — TELEPHONE (OUTPATIENT)
Dept: CARDIOLOGY | Facility: CLINIC | Age: 68
End: 2023-05-10
Payer: MEDICARE

## 2023-05-10 NOTE — TELEPHONE ENCOUNTER
5/10/23 spoke with patient, she had questions about upcoming procedure on tomorrow, provided number to Cath Lab to answer questions, understanding verbalized.  SRM

## 2023-05-10 NOTE — TELEPHONE ENCOUNTER
----- Message from Liana Pan sent at 5/10/2023  9:24 AM CDT -----  Type:  Needs Medical Advice    Who Called: pt  Would the patient rather a call back or a response via [facility]? call  Best Call Back Number: [phone]  Additional Information: pt would like a call from office regarding up coming surgery on 05/11/2023 would like to know if she needing to take any anabiotic before surgery on 05/11/2023 please call

## 2023-05-11 ENCOUNTER — TELEPHONE (OUTPATIENT)
Dept: PODIATRY | Facility: CLINIC | Age: 68
End: 2023-05-11
Payer: MEDICARE

## 2023-05-11 ENCOUNTER — OFFICE VISIT (OUTPATIENT)
Dept: PODIATRY | Facility: CLINIC | Age: 68
End: 2023-05-11
Payer: MEDICARE

## 2023-05-11 ENCOUNTER — HOSPITAL ENCOUNTER (OUTPATIENT)
Dept: RADIOLOGY | Facility: HOSPITAL | Age: 68
Discharge: HOME | End: 2023-05-11
Attending: PODIATRIST
Payer: MEDICARE

## 2023-05-11 ENCOUNTER — HOSPITAL ENCOUNTER (OUTPATIENT)
Facility: HOSPITAL | Age: 68
Discharge: HOME | End: 2023-05-11
Attending: INTERNAL MEDICINE | Admitting: INTERNAL MEDICINE
Payer: MEDICARE

## 2023-05-11 VITALS
HEIGHT: 67 IN | DIASTOLIC BLOOD PRESSURE: 77 MMHG | SYSTOLIC BLOOD PRESSURE: 139 MMHG | RESPIRATION RATE: 22 BRPM | BODY MASS INDEX: 23.54 KG/M2 | TEMPERATURE: 98 F | WEIGHT: 150 LBS | OXYGEN SATURATION: 100 % | HEART RATE: 62 BPM

## 2023-05-11 VITALS
HEIGHT: 67 IN | DIASTOLIC BLOOD PRESSURE: 81 MMHG | SYSTOLIC BLOOD PRESSURE: 117 MMHG | WEIGHT: 150 LBS | HEART RATE: 69 BPM | BODY MASS INDEX: 23.54 KG/M2

## 2023-05-11 DIAGNOSIS — I83.812 VARICOSE VEINS OF LEG WITH PAIN, LEFT: ICD-10-CM

## 2023-05-11 DIAGNOSIS — M79.675 TOE PAIN, LEFT: Primary | ICD-10-CM

## 2023-05-11 DIAGNOSIS — M10.9 GOUT, ARTHROPATHY: ICD-10-CM

## 2023-05-11 DIAGNOSIS — M79.675 TOE PAIN, LEFT: ICD-10-CM

## 2023-05-11 DIAGNOSIS — I87.2 VENOUS REFLUX: ICD-10-CM

## 2023-05-11 PROCEDURE — 99204 PR OFFICE/OUTPT VISIT, NEW, LEVL IV, 45-59 MIN: ICD-10-PCS | Mod: S$GLB,,, | Performed by: PODIATRIST

## 2023-05-11 PROCEDURE — 1160F RVW MEDS BY RX/DR IN RCRD: CPT | Mod: CPTII,S$GLB,, | Performed by: PODIATRIST

## 2023-05-11 PROCEDURE — 1159F PR MEDICATION LIST DOCUMENTED IN MEDICAL RECORD: ICD-10-PCS | Mod: CPTII,S$GLB,, | Performed by: PODIATRIST

## 2023-05-11 PROCEDURE — 73630 X-RAY EXAM OF FOOT: CPT | Mod: 26,LT,, | Performed by: RADIOLOGY

## 2023-05-11 PROCEDURE — 3008F BODY MASS INDEX DOCD: CPT | Mod: CPTII,S$GLB,, | Performed by: PODIATRIST

## 2023-05-11 PROCEDURE — 63600175 PHARM REV CODE 636 W HCPCS: Performed by: INTERNAL MEDICINE

## 2023-05-11 PROCEDURE — 1160F PR REVIEW ALL MEDS BY PRESCRIBER/CLIN PHARMACIST DOCUMENTED: ICD-10-PCS | Mod: CPTII,S$GLB,, | Performed by: PODIATRIST

## 2023-05-11 PROCEDURE — 36465 NJX NONCMPND SCLRSNT 1 VEIN: CPT | Mod: LT | Performed by: INTERNAL MEDICINE

## 2023-05-11 PROCEDURE — 1159F MED LIST DOCD IN RCRD: CPT | Mod: CPTII,S$GLB,, | Performed by: PODIATRIST

## 2023-05-11 PROCEDURE — 1125F AMNT PAIN NOTED PAIN PRSNT: CPT | Mod: CPTII,S$GLB,, | Performed by: PODIATRIST

## 2023-05-11 PROCEDURE — C9399 UNCLASSIFIED DRUGS OR BIOLOG: HCPCS | Performed by: INTERNAL MEDICINE

## 2023-05-11 PROCEDURE — 99204 OFFICE O/P NEW MOD 45 MIN: CPT | Mod: S$GLB,,, | Performed by: PODIATRIST

## 2023-05-11 PROCEDURE — 3008F PR BODY MASS INDEX (BMI) DOCUMENTED: ICD-10-PCS | Mod: CPTII,S$GLB,, | Performed by: PODIATRIST

## 2023-05-11 PROCEDURE — 73630 X-RAY EXAM OF FOOT: CPT | Mod: TC,PN,LT

## 2023-05-11 PROCEDURE — 3074F SYST BP LT 130 MM HG: CPT | Mod: CPTII,S$GLB,, | Performed by: PODIATRIST

## 2023-05-11 PROCEDURE — 36465 PR INJ, NONCMPND FOAM SCLEROSANT, 1 VEIN: ICD-10-PCS | Mod: LT,,, | Performed by: INTERNAL MEDICINE

## 2023-05-11 PROCEDURE — 1101F PT FALLS ASSESS-DOCD LE1/YR: CPT | Mod: CPTII,S$GLB,, | Performed by: PODIATRIST

## 2023-05-11 PROCEDURE — 3079F DIAST BP 80-89 MM HG: CPT | Mod: CPTII,S$GLB,, | Performed by: PODIATRIST

## 2023-05-11 PROCEDURE — 3079F PR MOST RECENT DIASTOLIC BLOOD PRESSURE 80-89 MM HG: ICD-10-PCS | Mod: CPTII,S$GLB,, | Performed by: PODIATRIST

## 2023-05-11 PROCEDURE — 1101F PR PT FALLS ASSESS DOC 0-1 FALLS W/OUT INJ PAST YR: ICD-10-PCS | Mod: CPTII,S$GLB,, | Performed by: PODIATRIST

## 2023-05-11 PROCEDURE — 36465 NJX NONCMPND SCLRSNT 1 VEIN: CPT | Mod: LT,,, | Performed by: INTERNAL MEDICINE

## 2023-05-11 PROCEDURE — 25000003 PHARM REV CODE 250: Performed by: INTERNAL MEDICINE

## 2023-05-11 PROCEDURE — 73630 XR FOOT COMPLETE 3 VIEW LEFT: ICD-10-PCS | Mod: 26,LT,, | Performed by: RADIOLOGY

## 2023-05-11 PROCEDURE — 3288F PR FALLS RISK ASSESSMENT DOCUMENTED: ICD-10-PCS | Mod: CPTII,S$GLB,, | Performed by: PODIATRIST

## 2023-05-11 PROCEDURE — C1894 INTRO/SHEATH, NON-LASER: HCPCS | Performed by: INTERNAL MEDICINE

## 2023-05-11 PROCEDURE — 3288F FALL RISK ASSESSMENT DOCD: CPT | Mod: CPTII,S$GLB,, | Performed by: PODIATRIST

## 2023-05-11 PROCEDURE — 3074F PR MOST RECENT SYSTOLIC BLOOD PRESSURE < 130 MM HG: ICD-10-PCS | Mod: CPTII,S$GLB,, | Performed by: PODIATRIST

## 2023-05-11 PROCEDURE — 99999 PR PBB SHADOW E&M-EST. PATIENT-LVL III: ICD-10-PCS | Mod: PBBFAC,,, | Performed by: PODIATRIST

## 2023-05-11 PROCEDURE — 1125F PR PAIN SEVERITY QUANTIFIED, PAIN PRESENT: ICD-10-PCS | Mod: CPTII,S$GLB,, | Performed by: PODIATRIST

## 2023-05-11 PROCEDURE — 99999 PR PBB SHADOW E&M-EST. PATIENT-LVL III: CPT | Mod: PBBFAC,,, | Performed by: PODIATRIST

## 2023-05-11 RX ORDER — LIDOCAINE HYDROCHLORIDE 10 MG/ML
INJECTION INFILTRATION; PERINEURAL
Status: DISCONTINUED | OUTPATIENT
Start: 2023-05-11 | End: 2023-05-11 | Stop reason: HOSPADM

## 2023-05-11 NOTE — PROGRESS NOTES
"Subjective:     Patient ID: Zenia Canela is a 67 y.o. female.    Chief Complaint: Toe Pain (Left 2nd toe pain referred per Dr Guillen had ablation done this morning per Wilmer)     Referred by   who performed venous ablation to left leg earlier today.  Patient states that the left 2nd toe became red, swollen and painful approximately 3 days ago.  She states that she  woke up with the pain gradually worsened.  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  She has a history of gastric sleeve 6 years ago.  She does admit to having Boiled shrimp last weekend and had crawfish yesterday.    Vitals:    23 1430   BP: 117/81   Pulse: 69   Weight: 68 kg (150 lb)   Height: 5' 7" (1.702 m)   PainSc:   5   PainLoc: Toe      Past Medical History:   Diagnosis Date    Benign essential hypertension 2012    Bilateral primary osteoarthritis of knee 2021    Venous insufficiency 2012       Past Surgical History:   Procedure Laterality Date    BARIATRIC SURGERY      gastric sleeve    CARPAL TUNNEL RELEASE       SECTION, LOW TRANSVERSE      CHOLECYSTECTOMY      HERNIA REPAIR         Family History   Problem Relation Age of Onset    Stroke Father     Heart attack Father     COPD Mother     Cataracts Mother     Heart disease Unknown     Hyperlipidemia Unknown     Cataracts Sister     Amblyopia Neg Hx     Blindness Neg Hx     Glaucoma Neg Hx     Macular degeneration Neg Hx     Retinal detachment Neg Hx     Strabismus Neg Hx        Social History     Socioeconomic History    Marital status:    Occupational History     Employer: [employer]   Tobacco Use    Smoking status: Never    Smokeless tobacco: Never   Substance and Sexual Activity    Alcohol use: No     Comment: Moderate    Drug use: No   Social History Narrative    Mom is Hansa Savage. She is a .  No smoking.         Current Outpatient " Medications   Medication Sig Dispense Refill    cholecalciferol, vitamin D3, 125 mcg (5,000 unit) capsule Take 1 capsule (5,000 Units total) by mouth daily with breakfast. 100 capsule 4    hydroCHLOROthiazide (HYDRODIURIL) 25 MG tablet TAKE 1 TABLET BY MOUTH EVERY DAY 90 tablet 1    multivitamin capsule Take 1 capsule by mouth once daily.      tirzepatide 10 mg/0.5 mL PnIj inject 1 syringe into the skin every week 4 pen 2    varicella-zoster gE vac,2 of 2 50 mcg SusR Administer SHINGRIX shingles vaccine dose 2 of 2 to complete recommended shingles vaccination series secondary to patient's age 1 each 0    MOUNJARO 5 mg/0.5 mL PnIj 7.5 mg once a week. Once weekly      tirzepatide (MOUNJARO) 7.5 mg/0.5 mL PnIj INJECT 1 SYRINGE INTO THE SKIN EVERY WEEK 4 pen 2    tirzepatide 10 mg/0.5 mL PnIj Inject 10 mg into the skin every 7 days. 4 pen 2    tirzepatide 10 mg/0.5 mL PnIj Inject 10mg into the skin once weekly. 4 pen 3     Current Facility-Administered Medications   Medication Dose Route Frequency Provider Last Rate Last Admin    polidocanol 1 % (20 mg/2 mL) Soln 2 mL  2 mL Intravenous Q30 Days Long Griggs MD           Review of patient's allergies indicates:   Allergen Reactions    Phenytoin sodium extended      Other reaction(s): jittery         Review of Systems   Constitutional: Negative for chills, fever and malaise/fatigue.   Cardiovascular:  Negative for chest pain, claudication and leg swelling.   Respiratory:  Negative for cough and shortness of breath.    Skin:  Positive for color change.   Musculoskeletal:  Positive for gout. Negative for back pain, joint pain, muscle cramps and muscle weakness.   Gastrointestinal:  Negative for nausea and vomiting.   Neurological:  Negative for numbness, paresthesias and weakness.   Psychiatric/Behavioral:  Negative for altered mental status.       Objective:     Physical Exam  Constitutional:       General: She is not in acute distress.     Appearance: Normal  appearance. She is not ill-appearing.   Cardiovascular:      Pulses:           Dorsalis pedis pulses are 2+ on the right side and 2+ on the left side.        Posterior tibial pulses are 2+ on the right side and 2+ on the left side.      Comments:   Mild edema to the right lower extremity with varicosities noted.  The left leg is bandaged.  Skin temp is warm to the foot bilateral.  Musculoskeletal:      Comments:   Localized pain, edema and warmth to the left 2nd toe with semi rigid flexion contracture of the PIPJ and DIPJ.   Skin:     General: Skin is warm.      Capillary Refill: Capillary refill takes less than 2 seconds.      Findings: Erythema present. No ecchymosis.      Nails: There is no clubbing.      Comments:   No open wound to the left foot.   Neurological:      Mental Status: She is alert.           Assessment:      Encounter Diagnoses   Name Primary?    Toe pain, left Yes    Gout, arthropathy      Plan:     Zenia was seen today for toe pain.    Diagnoses and all orders for this visit:    Toe pain, left  -     Uric acid; Future  -     Comprehensive Metabolic Panel; Future  -     CBC auto differential; Future  -     Sedimentation rate; Future  -     C-reactive protein; Future  -     X-Ray Foot Complete Left; Future    Gout, arthropathy  -     Uric acid; Future  -     Comprehensive Metabolic Panel; Future  -     CBC auto differential; Future  -     Sedimentation rate; Future  -     C-reactive protein; Future  -     X-Ray Foot Complete Left; Future      I counseled the patient on her conditions, their implications and medical management.      Discussed from a clinical standpoint appears as though she has acute gout arthropathy isolated to left 2nd toe DIPJ.  Baseline x-rays ordered to rule out a fracture or underlying osseous pathology.  In addition ordered labs to ensure that she has adequate kidney function and no significant signs of infection.  If uric acid is significantly elevated and will prescribed  colchicine with probenecid otherwise could do isolated colchicine or Medrol Dosepak to help with the symptoms.  We did discuss that most likely this will be self-limiting 7-10 days and that she should modify her diet and avoid the shellfish.  She should stay well hydrated and recommend tart cherry juice once a day.      RTC p.r.n. as discussed.    A portion of this note was generated by voice recognition software and may contain spelling and grammar errors.        .

## 2023-05-11 NOTE — DISCHARGE SUMMARY
[facility] - [facility] (Hospital)  Discharge Note  Short Stay    Procedure(s) (LRB):  Ablation (Left)      OUTCOME: Patient tolerated treatment/procedure well without complication and is now ready for discharge.    DISPOSITION: Home or Self Care    FINAL DIAGNOSIS:  Venous reflux    FOLLOWUP: In clinic    DISCHARGE INSTRUCTIONS:  No discharge procedures on file.       S/p L GSV ablation   Prox 8 cc   Distal 7 cc      Varithena        Current Discharge Medication List        CONTINUE these medications which have NOT CHANGED    Details   cholecalciferol, vitamin D3, 125 mcg (5,000 unit) capsule Take 1 capsule (5,000 Units total) by mouth daily with breakfast.  Qty: 100 capsule, Refills: 4    Associated Diagnoses: History of bariatric surgery; Vitamin D deficiency      hydroCHLOROthiazide (HYDRODIURIL) 25 MG tablet TAKE 1 TABLET BY MOUTH EVERY DAY  Qty: 90 tablet, Refills: 1    Comments: DX Code Needed  .  Associated Diagnoses: Benign essential hypertension      multivitamin capsule Take 1 capsule by mouth once daily.      varicella-zoster gE vac,2 of 2 50 mcg SusR Administer SHINGRIX shingles vaccine dose 2 of 2 to complete recommended shingles vaccination series secondary to patient's age  Qty: 1 each, Refills: 0    Associated Diagnoses: Need for shingles vaccine      MOUNJARO 5 mg/0.5 mL PnIj 7.5 mg once a week. Once weekly      tirzepatide (MOUNJARO) 7.5 mg/0.5 mL PnIj INJECT 1 SYRINGE INTO THE SKIN EVERY WEEK  Qty: 4 pen, Refills: 2      !! tirzepatide 10 mg/0.5 mL PnIj Inject 10 mg into the skin every 7 days.  Qty: 4 pen, Refills: 2      !! tirzepatide 10 mg/0.5 mL PnIj inject 1 syringe into the skin every week  Qty: 4 pen, Refills: 2      !! tirzepatide 10 mg/0.5 mL PnIj Inject 10mg into the skin once weekly.  Qty: 4 pen, Refills: 3       !! - Potential duplicate medications found. Please discuss with provider.             TIME SPENT ON DISCHARGE: 35 minutes

## 2023-05-11 NOTE — H&P
Patient ID:  Zenia Canela is a 67 y.o. 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  Her symptoms have persisted despite religiously wearing compression stockings 20-30 mm of mercury, exercising, and weight maintenance.         She has a past medical history of left ankle varicosity sclerotherapy in 2012, no previous DVT, complicated pregnancy with her last child 34 years ago with severe compression of left lower abdominal quadrant.  Aspirin 325 mg daily was recently started for superficial phlebitis which has resolved. She is a 10 year cardiovascular risk less than 7.5%        Venous US 6/2022                            R GSV 2 mm - reflux              R LSV 3 mm - reflux              R POP + reflux > 500 msec a                 L GSV 5 mm + reflux > 500 msec               L LSV 3 mm - reflux                  No DVT         CEAP 4c  VCSS 11                    Patient Active Problem List     Diagnosis Date Noted    Impingement syndrome of right shoulder 08/22/2022    Right shoulder pain 08/22/2022    Decreased right shoulder range of motion 08/22/2022    Impaired mobility and ADLs 08/22/2022    Osteopenia of both hips 07/12/2022    Thrombophlebitis of superficial veins of left lower extremity 06/09/2022    Varicose veins of leg with pain, left 06/09/2022    Lower extremity venous stasis 06/09/2022    Bilateral primary osteoarthritis of knee 07/12/2021    History of total right knee replacement 07/12/2021    Impaired mobility 06/22/2021    History of bariatric surgery 09/25/2017    Vitamin D deficiency 08/07/2016    SI (stress incontinence), female 10/15/2015       kegal exercises, weight  loss, estrogen cream.        Overweight (BMI 25.0-29.9) 10/15/2015    Benign essential hypertension 2012    Venous reflux 2012               Right Arm BP - Sittin/82  Left Arm BP - Sittin/82           LABS        LAST HbA1c        Lab Results   Component Value Date     HGBA1C 5.1 2022         Lipid panel        Lab Results   Component Value Date     CHOL 196 2022     CHOL 180 2021     CHOL 223 (H) 2019            Lab Results   Component Value Date     HDL 87 (H) 2022     HDL 84 (H) 2021     HDL 98 (H) 2019            Lab Results   Component Value Date     LDLCALC 102.0 2022     LDLCALC 85.8 2021     LDLCALC 112.6 2019            Lab Results   Component Value Date     TRIG 35 2022     TRIG 51 2021     TRIG 62 2019            Lab Results   Component Value Date     CHOLHDL 44.4 2022     CHOLHDL 46.7 2021     CHOLHDL 43.9 2019               Review of Systems   Constitutional: Negative for diaphoresis, night sweats, weight gain and weight loss.   HENT:  Negative for congestion.    Eyes:  Negative for blurred vision, discharge and double vision.   Cardiovascular:  Positive for leg swelling (L >> R ). Negative for chest pain, claudication, cyanosis, dyspnea on exertion, irregular heartbeat, near-syncope, orthopnea, palpitations, paroxysmal nocturnal dyspnea and syncope.   Respiratory:  Negative for cough, shortness of breath and wheezing.    Endocrine: Negative for cold intolerance, heat intolerance and polyphagia.   Hematologic/Lymphatic: Negative for adenopathy and bleeding problem. Does not bruise/bleed easily.   Skin:  Negative for dry skin and nail changes.   Musculoskeletal:  Negative for arthritis, back pain, falls, joint pain, myalgias and neck pain.   Gastrointestinal:  Negative for bloating, abdominal pain, change in bowel habit and constipation.   Genitourinary:  Negative for bladder  incontinence, dysuria, flank pain, genital sores and missed menses.   Neurological:  Negative for aphonia, brief paralysis, difficulty with concentration, dizziness and weakness.   Psychiatric/Behavioral:  Negative for altered mental status and memory loss. The patient does not have insomnia.    Allergic/Immunologic: Negative for environmental allergies.      Objective:   Physical Exam  Constitutional:       Appearance: She is well-developed.      Interventions: She is not intubated.  HENT:      Head: Normocephalic and atraumatic.      Right Ear: External ear normal.      Left Ear: External ear normal.   Eyes:      General: No scleral icterus.        Right eye: No discharge.         Left eye: No discharge.      Conjunctiva/sclera: Conjunctivae normal.      Pupils: Pupils are equal, round, and reactive to light.   Neck:      Thyroid: No thyromegaly.      Vascular: Normal carotid pulses. No carotid bruit, hepatojugular reflux or JVD.      Trachea: No tracheal deviation.   Cardiovascular:      Rate and Rhythm: Normal rate and regular rhythm. No extrasystoles are present.     Chest Wall: PMI is not displaced.      Pulses:           Carotid pulses are 2+ on the right side and 2+ on the left side.       Radial pulses are 2+ on the right side and 2+ on the left side.        Femoral pulses are 2+ on the right side and 2+ on the left side.       Popliteal pulses are 2+ on the right side and 2+ on the left side.        Dorsalis pedis pulses are 2+ on the right side and 2+ on the left side.        Posterior tibial pulses are 2+ on the right side and 2+ on the left side.      Heart sounds: S1 normal and S2 normal. Heart sounds not distant. No midsystolic click. No murmur heard.    No friction rub. No gallop. No S3 sounds.   Pulmonary:      Effort: Pulmonary effort is normal. No tachypnea, bradypnea, accessory muscle usage or respiratory distress. She is not intubated.      Breath sounds: Normal breath sounds. No stridor. No  decreased breath sounds, wheezing or rales.   Chest:      Chest wall: No tenderness.   Abdominal:      General: There is no distension or abdominal bruit.      Palpations: There is no mass or pulsatile mass.      Tenderness: There is no abdominal tenderness. There is no guarding or rebound.   Musculoskeletal:         General: No tenderness. Normal range of motion.      Cervical back: Normal range of motion and neck supple.   Lymphadenopathy:      Cervical: No cervical adenopathy.      Comments:      Left leg edema and mildly larger than right leg           Skin:     General: Skin is warm.      Coloration: Skin is not pale.      Findings: No erythema or rash.      Comments:      Engorged varicose veins            No ulcers              Neurological:      Mental Status: She is alert and oriented to person, place, and time.      Cranial Nerves: No cranial nerve deficit.      Coordination: Coordination normal.      Deep Tendon Reflexes: Reflexes are normal and symmetric.   Psychiatric:         Behavior: Behavior normal.         Thought Content: Thought content normal.         Judgment: Judgment normal.         Assessment:      1. Venous reflux    2. Varicose veins of leg with pain, left    3. Benign essential hypertension    4. Lower extremity venous stasis    5. Thrombophlebitis of superficial veins of left lower extremity             Plan:         She will proceed with left greater saphenous vein ablation with Varithena.  She has been religiously wearing compression stockings unfortunately his symptoms have persisted.  She is active.  She is been exercising without benefit from conservative measures.  Orders were placed today.  She will be called with the date and time for the procedure.  If there is no improvement after superficial vein intervention we will consider deep venous evaluation and intervention.  Her presentation is concerning for bilateral iliac vein compression. She has deep venous reflux disease as  noted in the right popliteal vein on ultrasound (6/2022) and her history of chronic left lower extremity edema since her last pregnancy 34 years ago.           Continue with compression stockings 20-30 mmHg.  Exercise.             Continue with current medical plan and lifestyle changes.  Return sooner for concerns or questions. If symptoms persist go to the ED  I have reviewed all pertinent data on this patient         I have reviewed the patient's medical history in detail and updated the computerized patient record.           Orders Placed This Encounter   Procedures    Case Request-Cath Lab: Ablation       Standing Status:   Standing       Number of Occurrences:   1       Order Specific Question:   CPT Code:       Answer:   MA ENDOVENOUS ABLATION THER CHEM ADHESIVE, 1ST VEIN [91651]       Order Specific Question:   CPT Code:       Answer:   MA INJ, NONCMPND FOAM SCLEROSANT, 1 VEIN [33983]       Order Specific Question:   CPT Code:       Answer:   MA  US GUIDE, VASCULAR ACCESS [91645]       Order Specific Question:   Medical Necessity:       Answer:   Medically Urgent [101]       Order Specific Question:   Is an on-site pathologist required for this procedure?       Answer:   N/A         Follow up as scheduled. Return sooner for concerns or questions                 She expressed verbal understanding and agreed with the plan           -In today's visit, at least 4 established conditions that pose a risk to life or bodily function have been addressed and the conditions are severe.     -In today's visit, monitoring for drug toxicity was accomplished.             Patient's Medications   New Prescriptions     No medications on file   Previous Medications     CHOLECALCIFEROL, VITAMIN D3, 125 MCG (5,000 UNIT) CAPSULE    Take 1 capsule (5,000 Units total) by mouth daily with breakfast.     HYDROCHLOROTHIAZIDE (HYDRODIURIL) 25 MG TABLET    TAKE 1 TABLET BY MOUTH EVERY DAY     MOUNJARO 5 MG/0.5 ML PNIJ    INJECT 5 MG  UNDER SKIN ONCE WEEKLY     MULTIVITAMIN CAPSULE    Take 1 capsule by mouth once daily.     VARICELLA-ZOSTER GE VAC,2 OF 2 50 MCG SUSR    Administer SHINGRIX shingles vaccine dose 2 of 2 to complete recommended shingles vaccination series secondary to patient's age   Modified Medications     No medications on file   Discontinued Medications

## 2023-05-11 NOTE — NURSING
Pt discharged per MD order. Vitals stable. Pt ambulated in unit with no problems and dressed self. Left leg sites WNL, no swelling, bleeding, oozing, tenderness, or hematoma present. All discharge instructions given and pt verbalizes full understanding to all instructions and all questions answered. Pt ambulated out of recovery area independently.

## 2023-05-11 NOTE — TELEPHONE ENCOUNTER
Spoke with Dae with Dr Guillen's office to assist Ms Canela with making an appt with Dr Mckeon. Scheduled pt for 5/11/23 at 2 pm. Encouraged Dae to please notify or call me if the pt cannot make the appt with Dr Mckeon today or needs to be rescheduled. No other needs voiced this time.

## 2023-05-12 ENCOUNTER — PATIENT MESSAGE (OUTPATIENT)
Dept: PODIATRY | Facility: CLINIC | Age: 68
End: 2023-05-12
Payer: MEDICARE

## 2023-05-12 ENCOUNTER — PATIENT OUTREACH (OUTPATIENT)
Dept: ADMINISTRATIVE | Facility: HOSPITAL | Age: 68
End: 2023-05-12
Payer: MEDICARE

## 2023-05-12 ENCOUNTER — PATIENT MESSAGE (OUTPATIENT)
Dept: ADMINISTRATIVE | Facility: HOSPITAL | Age: 68
End: 2023-05-12
Payer: MEDICARE

## 2023-05-12 ENCOUNTER — PATIENT MESSAGE (OUTPATIENT)
Dept: CARDIOLOGY | Facility: CLINIC | Age: 68
End: 2023-05-12
Payer: MEDICARE

## 2023-05-12 DIAGNOSIS — Z79.899 MEDICATION MANAGEMENT: ICD-10-CM

## 2023-05-12 DIAGNOSIS — E55.9 VITAMIN D DEFICIENCY: Primary | ICD-10-CM

## 2023-05-12 DIAGNOSIS — I10 BENIGN ESSENTIAL HYPERTENSION: ICD-10-CM

## 2023-05-12 RX ORDER — METHYLPREDNISOLONE 4 MG/1
TABLET ORAL
Qty: 1 EACH | Refills: 0 | Status: SHIPPED | OUTPATIENT
Start: 2023-05-12 | End: 2023-05-26 | Stop reason: ALTCHOICE

## 2023-05-12 NOTE — PROGRESS NOTES
Care Everywhere updates requested and reviewed.  Immunizations reconciled. Media reports reviewed.  Duplicate HM overrides and  orders removed.  Overdue HM topic chart audit and/or requested.  Overdue lab testing linked to upcoming lab appointments if applies.      Health Maintenance Due   Topic Date Due    COVID-19 Vaccine (4 - Booster for Pfizer series) 2021    Pneumococcal Vaccines (Age 65+) (2 - PPSV23 if available, else PCV20) 2022    Colorectal Cancer Screening  2023    Mammogram  2023

## 2023-05-12 NOTE — TELEPHONE ENCOUNTER
Please ensure patient reads her [facility] message. I sent Medrol dose jaime to pharmacy.

## 2023-05-19 ENCOUNTER — LAB VISIT (OUTPATIENT)
Dept: LAB | Facility: HOSPITAL | Age: 68
End: 2023-05-19
Attending: FAMILY MEDICINE
Payer: MEDICARE

## 2023-05-19 DIAGNOSIS — Z79.899 MEDICATION MANAGEMENT: ICD-10-CM

## 2023-05-19 DIAGNOSIS — I10 BENIGN ESSENTIAL HYPERTENSION: ICD-10-CM

## 2023-05-19 DIAGNOSIS — E55.9 VITAMIN D DEFICIENCY: ICD-10-CM

## 2023-05-19 LAB
25(OH)D3+25(OH)D2 SERPL-MCNC: 52 NG/ML (ref 30–96)
ALBUMIN SERPL BCP-MCNC: 3.7 G/DL (ref 3.5–5.2)
ALP SERPL-CCNC: 55 U/L (ref 55–135)
ALT SERPL W/O P-5'-P-CCNC: 17 U/L (ref 10–44)
ANION GAP SERPL CALC-SCNC: 8 MMOL/L (ref 8–16)
AST SERPL-CCNC: 19 U/L (ref 10–40)
BASOPHILS # BLD AUTO: 0.03 K/UL (ref 0–0.2)
BASOPHILS NFR BLD: 0.6 % (ref 0–1.9)
BILIRUB SERPL-MCNC: 0.8 MG/DL (ref 0.1–1)
BUN SERPL-MCNC: 22 MG/DL (ref 8–23)
CALCIUM SERPL-MCNC: 9.8 MG/DL (ref 8.7–10.5)
CHLORIDE SERPL-SCNC: 102 MMOL/L (ref 95–110)
CHOLEST SERPL-MCNC: 186 MG/DL (ref 120–199)
CHOLEST/HDLC SERPL: 2 {RATIO} (ref 2–5)
CO2 SERPL-SCNC: 31 MMOL/L (ref 23–29)
CREAT SERPL-MCNC: 1 MG/DL (ref 0.5–1.4)
DIFFERENTIAL METHOD: ABNORMAL
EOSINOPHIL # BLD AUTO: 0.1 K/UL (ref 0–0.5)
EOSINOPHIL NFR BLD: 2.1 % (ref 0–8)
ERYTHROCYTE [DISTWIDTH] IN BLOOD BY AUTOMATED COUNT: 12.6 % (ref 11.5–14.5)
EST. GFR  (NO RACE VARIABLE): >60 ML/MIN/1.73 M^2
ESTIMATED AVG GLUCOSE: 94 MG/DL (ref 68–131)
GLUCOSE SERPL-MCNC: 79 MG/DL (ref 70–110)
HBA1C MFR BLD: 4.9 % (ref 4–5.6)
HCT VFR BLD AUTO: 40 % (ref 37–48.5)
HDLC SERPL-MCNC: 91 MG/DL (ref 40–75)
HDLC SERPL: 48.9 % (ref 20–50)
HGB BLD-MCNC: 13.7 G/DL (ref 12–16)
IMM GRANULOCYTES # BLD AUTO: 0 K/UL (ref 0–0.04)
IMM GRANULOCYTES NFR BLD AUTO: 0 % (ref 0–0.5)
LDLC SERPL CALC-MCNC: 84 MG/DL (ref 63–159)
LYMPHOCYTES # BLD AUTO: 2 K/UL (ref 1–4.8)
LYMPHOCYTES NFR BLD: 41 % (ref 18–48)
MCH RBC QN AUTO: 31.4 PG (ref 27–31)
MCHC RBC AUTO-ENTMCNC: 34.3 G/DL (ref 32–36)
MCV RBC AUTO: 92 FL (ref 82–98)
MONOCYTES # BLD AUTO: 0.5 K/UL (ref 0.3–1)
MONOCYTES NFR BLD: 10.2 % (ref 4–15)
NEUTROPHILS # BLD AUTO: 2.2 K/UL (ref 1.8–7.7)
NEUTROPHILS NFR BLD: 46.1 % (ref 38–73)
NONHDLC SERPL-MCNC: 95 MG/DL
NRBC BLD-RTO: 0 /100 WBC
PLATELET # BLD AUTO: 164 K/UL (ref 150–450)
PMV BLD AUTO: 9.2 FL (ref 9.2–12.9)
POTASSIUM SERPL-SCNC: 3.4 MMOL/L (ref 3.5–5.1)
PROT SERPL-MCNC: 6.6 G/DL (ref 6–8.4)
RBC # BLD AUTO: 4.36 M/UL (ref 4–5.4)
SODIUM SERPL-SCNC: 141 MMOL/L (ref 136–145)
TRIGL SERPL-MCNC: 55 MG/DL (ref 30–150)
TSH SERPL DL<=0.005 MIU/L-ACNC: 1.76 UIU/ML (ref 0.4–4)
WBC # BLD AUTO: 4.81 K/UL (ref 3.9–12.7)

## 2023-05-19 PROCEDURE — 80053 COMPREHEN METABOLIC PANEL: CPT | Performed by: FAMILY MEDICINE

## 2023-05-19 PROCEDURE — 83036 HEMOGLOBIN GLYCOSYLATED A1C: CPT | Performed by: FAMILY MEDICINE

## 2023-05-19 PROCEDURE — 85025 COMPLETE CBC W/AUTO DIFF WBC: CPT | Performed by: FAMILY MEDICINE

## 2023-05-19 PROCEDURE — 82306 VITAMIN D 25 HYDROXY: CPT | Performed by: FAMILY MEDICINE

## 2023-05-19 PROCEDURE — 84443 ASSAY THYROID STIM HORMONE: CPT | Performed by: FAMILY MEDICINE

## 2023-05-19 PROCEDURE — 80061 LIPID PANEL: CPT | Performed by: FAMILY MEDICINE

## 2023-05-19 PROCEDURE — 36415 COLL VENOUS BLD VENIPUNCTURE: CPT | Performed by: FAMILY MEDICINE

## 2023-05-26 ENCOUNTER — OFFICE VISIT (OUTPATIENT)
Dept: FAMILY MEDICINE | Facility: CLINIC | Age: 68
End: 2023-05-26
Payer: MEDICARE

## 2023-05-26 VITALS
OXYGEN SATURATION: 99 % | HEART RATE: 69 BPM | WEIGHT: 151.44 LBS | TEMPERATURE: 98 F | BODY MASS INDEX: 23.77 KG/M2 | DIASTOLIC BLOOD PRESSURE: 84 MMHG | HEIGHT: 67 IN | SYSTOLIC BLOOD PRESSURE: 126 MMHG

## 2023-05-26 DIAGNOSIS — Z79.899 MEDICATION MANAGEMENT: ICD-10-CM

## 2023-05-26 DIAGNOSIS — M54.41 CHRONIC BILATERAL LOW BACK PAIN WITH BILATERAL SCIATICA: ICD-10-CM

## 2023-05-26 DIAGNOSIS — I83.812 VARICOSE VEINS OF LEG WITH PAIN, LEFT: ICD-10-CM

## 2023-05-26 DIAGNOSIS — R19.4 CHANGE IN BOWEL HABIT: ICD-10-CM

## 2023-05-26 DIAGNOSIS — Z98.84 HISTORY OF BARIATRIC SURGERY: ICD-10-CM

## 2023-05-26 DIAGNOSIS — E55.9 VITAMIN D DEFICIENCY: ICD-10-CM

## 2023-05-26 DIAGNOSIS — G89.29 CHRONIC BILATERAL LOW BACK PAIN WITH BILATERAL SCIATICA: ICD-10-CM

## 2023-05-26 DIAGNOSIS — I10 BENIGN ESSENTIAL HYPERTENSION: ICD-10-CM

## 2023-05-26 DIAGNOSIS — Z12.11 COLON CANCER SCREENING: ICD-10-CM

## 2023-05-26 DIAGNOSIS — I87.8 LOWER EXTREMITY VENOUS STASIS: ICD-10-CM

## 2023-05-26 DIAGNOSIS — M54.42 CHRONIC BILATERAL LOW BACK PAIN WITH BILATERAL SCIATICA: ICD-10-CM

## 2023-05-26 DIAGNOSIS — M85.852 OSTEOPENIA OF BOTH HIPS: ICD-10-CM

## 2023-05-26 DIAGNOSIS — Z00.00 ROUTINE GENERAL MEDICAL EXAMINATION AT A HEALTH CARE FACILITY: Primary | ICD-10-CM

## 2023-05-26 DIAGNOSIS — Z12.31 ENCOUNTER FOR SCREENING MAMMOGRAM FOR BREAST CANCER: ICD-10-CM

## 2023-05-26 DIAGNOSIS — M17.0 BILATERAL PRIMARY OSTEOARTHRITIS OF KNEE: ICD-10-CM

## 2023-05-26 DIAGNOSIS — Z96.651 HISTORY OF TOTAL RIGHT KNEE REPLACEMENT: ICD-10-CM

## 2023-05-26 DIAGNOSIS — I87.2 VENOUS REFLUX: ICD-10-CM

## 2023-05-26 DIAGNOSIS — M85.851 OSTEOPENIA OF BOTH HIPS: ICD-10-CM

## 2023-05-26 DIAGNOSIS — M41.115 JUVENILE IDIOPATHIC SCOLIOSIS OF THORACOLUMBAR REGION: ICD-10-CM

## 2023-05-26 PROCEDURE — 3288F FALL RISK ASSESSMENT DOCD: CPT | Mod: CPTII,S$GLB,, | Performed by: FAMILY MEDICINE

## 2023-05-26 PROCEDURE — 1101F PR PT FALLS ASSESS DOC 0-1 FALLS W/OUT INJ PAST YR: ICD-10-PCS | Mod: CPTII,S$GLB,, | Performed by: FAMILY MEDICINE

## 2023-05-26 PROCEDURE — 3044F HG A1C LEVEL LT 7.0%: CPT | Mod: CPTII,S$GLB,, | Performed by: FAMILY MEDICINE

## 2023-05-26 PROCEDURE — 99397 PER PM REEVAL EST PAT 65+ YR: CPT | Mod: S$GLB,,, | Performed by: FAMILY MEDICINE

## 2023-05-26 PROCEDURE — 3044F PR MOST RECENT HEMOGLOBIN A1C LEVEL <7.0%: ICD-10-PCS | Mod: CPTII,S$GLB,, | Performed by: FAMILY MEDICINE

## 2023-05-26 PROCEDURE — 3008F BODY MASS INDEX DOCD: CPT | Mod: CPTII,S$GLB,, | Performed by: FAMILY MEDICINE

## 2023-05-26 PROCEDURE — 3074F PR MOST RECENT SYSTOLIC BLOOD PRESSURE < 130 MM HG: ICD-10-PCS | Mod: CPTII,S$GLB,, | Performed by: FAMILY MEDICINE

## 2023-05-26 PROCEDURE — 99397 PR PREVENTIVE VISIT,EST,65 & OVER: ICD-10-PCS | Mod: S$GLB,,, | Performed by: FAMILY MEDICINE

## 2023-05-26 PROCEDURE — 1159F MED LIST DOCD IN RCRD: CPT | Mod: CPTII,S$GLB,, | Performed by: FAMILY MEDICINE

## 2023-05-26 PROCEDURE — 1126F PR PAIN SEVERITY QUANTIFIED, NO PAIN PRESENT: ICD-10-PCS | Mod: CPTII,S$GLB,, | Performed by: FAMILY MEDICINE

## 2023-05-26 PROCEDURE — 99999 PR PBB SHADOW E&M-EST. PATIENT-LVL IV: ICD-10-PCS | Mod: PBBFAC,,, | Performed by: FAMILY MEDICINE

## 2023-05-26 PROCEDURE — 99999 PR PBB SHADOW E&M-EST. PATIENT-LVL IV: CPT | Mod: PBBFAC,,, | Performed by: FAMILY MEDICINE

## 2023-05-26 PROCEDURE — 3288F PR FALLS RISK ASSESSMENT DOCUMENTED: ICD-10-PCS | Mod: CPTII,S$GLB,, | Performed by: FAMILY MEDICINE

## 2023-05-26 PROCEDURE — 1159F PR MEDICATION LIST DOCUMENTED IN MEDICAL RECORD: ICD-10-PCS | Mod: CPTII,S$GLB,, | Performed by: FAMILY MEDICINE

## 2023-05-26 PROCEDURE — 1160F PR REVIEW ALL MEDS BY PRESCRIBER/CLIN PHARMACIST DOCUMENTED: ICD-10-PCS | Mod: CPTII,S$GLB,, | Performed by: FAMILY MEDICINE

## 2023-05-26 PROCEDURE — 3079F DIAST BP 80-89 MM HG: CPT | Mod: CPTII,S$GLB,, | Performed by: FAMILY MEDICINE

## 2023-05-26 PROCEDURE — 3074F SYST BP LT 130 MM HG: CPT | Mod: CPTII,S$GLB,, | Performed by: FAMILY MEDICINE

## 2023-05-26 PROCEDURE — 1101F PT FALLS ASSESS-DOCD LE1/YR: CPT | Mod: CPTII,S$GLB,, | Performed by: FAMILY MEDICINE

## 2023-05-26 PROCEDURE — 3079F PR MOST RECENT DIASTOLIC BLOOD PRESSURE 80-89 MM HG: ICD-10-PCS | Mod: CPTII,S$GLB,, | Performed by: FAMILY MEDICINE

## 2023-05-26 PROCEDURE — 1126F AMNT PAIN NOTED NONE PRSNT: CPT | Mod: CPTII,S$GLB,, | Performed by: FAMILY MEDICINE

## 2023-05-26 PROCEDURE — 3008F PR BODY MASS INDEX (BMI) DOCUMENTED: ICD-10-PCS | Mod: CPTII,S$GLB,, | Performed by: FAMILY MEDICINE

## 2023-05-26 PROCEDURE — 1160F RVW MEDS BY RX/DR IN RCRD: CPT | Mod: CPTII,S$GLB,, | Performed by: FAMILY MEDICINE

## 2023-05-26 RX ORDER — HYDROCHLOROTHIAZIDE 12.5 MG/1
12.5 TABLET ORAL DAILY
Qty: 90 TABLET | Refills: 4 | Status: SHIPPED | OUTPATIENT
Start: 2023-05-26 | End: 2024-05-25

## 2023-05-26 RX ORDER — HYDROCHLOROTHIAZIDE 25 MG/1
TABLET ORAL
Qty: 90 TABLET | Refills: 0 | OUTPATIENT
Start: 2023-05-26

## 2023-05-26 NOTE — PROGRESS NOTES
Office Visit    Patient Name: Zenia Canela    : 1955  MRN: [id number]    Subjective:  Zenia is a 67 y.o. female who presents today for:    Annual Exam    Zenia Canela presents today for annual wellness exam and monitoring of chronic conditions that include HTN controlled on HCTZ 25 mg daily, ISABELLA stable with use of premarin vaginal & s/p wt loss following gastric sleeve, overweight BMI--> NORMALIZED ON MOUNJARO FOLLOWING BARIATRIC SURGERY, vit D deficiency.   Her mom passed away in 2019 and had some dementia.    Has recovered well from a right knee replacement.  Has scoliosis with some recent worsening in back pain. [facility] therapy including PT/core exercises advised.  Has not been recently consistent with HEP for back pain.   Had a L varicose Vein Ablation 23 w/ Dr Guillen  associated pain and is recovering very well.      She is generally feeling well and without acute complaints except the worsening back pain mentioned above.      General lifestyle habits are as follows:    Diet is described as healthy-- good variety with high protein, drinking plenty of water-- at least 64 oz  Exercise is described as very good-- going to anytime fitness and walking, bike riding and swimming and incorporating PT exercises for back pain--   Sleep: fair-- has trouble staying asleep.  Weight: - was down 60+ from prior to her gastric bypass. Was struggling with some with weight gain but that has resolved w/ starting Mounjaro-- has lost 30+ lbs on GLP-1 and is now at normal BMI of 23.      Immunizations: TDaP 10/29/18, yearly influenza UTD 20, SHINGRIX 2/2 12/10/21, PREVNAR 13 21 but unclear if she has Pneumovax 23, COVID-19 Vaccine completed 21 W. BOOSTER 11/3/21- OMICRON BOOSTER ADVISED     Screening Tests: DEXA 22: mild osteopenia and repeat in 2-3 years, mammogram 22-- repeat 1 year & ORDERED, Colonoscopy 13- repeat 10 yrs (hyperplastic  polyps) & ORDERED, PAP/HPV WNL/(-) 10/14/15- repeat 5 yrs (no longer indicated >65 & no h/o abnormal), HEP C (-) 7/21/2004     Eye/Dental: Eye DUE, Dental UTD-        PAST MEDICAL HISTORY, SURGICAL/SOCIAL/FAMILY HISTORY REVIEWED AS PER CHART, WITH PERTINENT FINDINGS INCLUDED IN HISTORY SECTION OF NOTE.     Current Medications    Medication List with Changes/Refills   New Medications    HYDROCHLOROTHIAZIDE (HYDRODIURIL) 12.5 MG TAB    Take 1 tablet (12.5 mg total) by mouth once daily.   Current Medications    CHOLECALCIFEROL, VITAMIN D3, 125 MCG (5,000 UNIT) CAPSULE    Take 1 capsule (5,000 Units total) by mouth daily with breakfast.    MULTIVITAMIN CAPSULE    Take 1 capsule by mouth once daily.    TIRZEPATIDE 10 MG/0.5 ML PNIJ    inject 1 syringe into the skin every week   Discontinued Medications    HYDROCHLOROTHIAZIDE (HYDRODIURIL) 25 MG TABLET    TAKE 1 TABLET BY MOUTH EVERY DAY    METHYLPREDNISOLONE (MEDROL DOSEPACK) 4 MG TABLET    use as directed    VARICELLA-ZOSTER GE VAC,2 OF 2 50 MCG SUSR    Administer SHINGRIX shingles vaccine dose 2 of 2 to complete recommended shingles vaccination series secondary to patient's age       Allergies   Review of patient's allergies indicates:   Allergen Reactions    Phenytoin sodium extended      Other reaction(s): jittery         Review of Systems (Pertinent positives)  Review of Systems   Constitutional:  Negative for unexpected weight change.   HENT:  Negative for dental problem.    Eyes:  Negative for visual disturbance.   Respiratory:  Negative for shortness of breath.    Cardiovascular:  Negative for chest pain.   Gastrointestinal:  Negative for constipation and vomiting.   Genitourinary:  Negative for difficulty urinating.   Musculoskeletal:  Positive for back pain.   Allergic/Immunologic: Negative for environmental allergies.   Neurological:  Negative for dizziness.   Psychiatric/Behavioral:  Positive for sleep disturbance.      /84 (BP Location: Right arm,  "Patient Position: Sitting, BP Method: Medium (Manual))   Pulse 69   Temp 97.9 °F (36.6 °C) (Oral)   Ht 5' 7" (1.702 m)   Wt 68.7 kg (151 lb 7.3 oz)   LMP  (LMP Unknown)   SpO2 99%   BMI 23.72 kg/m²     Physical Exam  Vitals reviewed.   Constitutional:       General: She is not in acute distress.     Appearance: Normal appearance. She is well-developed.   HENT:      Head: Normocephalic and atraumatic.      Right Ear: Ear canal normal. Tympanic membrane is not erythematous or bulging.      Left Ear: Ear canal normal. Tympanic membrane is not erythematous or bulging.      Nose: Nose normal.      Mouth/Throat:      Mouth: Mucous membranes are moist.      Pharynx: No oropharyngeal exudate.   Eyes:      Extraocular Movements: Extraocular movements intact.      Conjunctiva/sclera: Conjunctivae normal.   Neck:      Thyroid: No thyroid mass or thyromegaly.      Vascular: No carotid bruit.   Cardiovascular:      Rate and Rhythm: Normal rate and regular rhythm.      Pulses:           Dorsalis pedis pulses are 2+ on the right side and 2+ on the left side.      Heart sounds: Normal heart sounds. No murmur heard.  Pulmonary:      Effort: Pulmonary effort is normal. No respiratory distress.      Breath sounds: Normal breath sounds.   Abdominal:      General: Bowel sounds are normal. There is no distension.      Palpations: Abdomen is soft. There is no mass.      Tenderness: There is no abdominal tenderness.   Musculoskeletal:         General: Normal range of motion.      Thoracic back: Spasms present. Scoliosis present.      Lumbar back: Spasms present. Scoliosis present.      Right lower leg: No edema.      Left lower leg: No edema.   Lymphadenopathy:      Cervical: No cervical adenopathy.   Skin:     General: Skin is warm and dry.      Findings: No rash.   Neurological:      General: No focal deficit present.      Mental Status: She is alert and oriented to person, place, and time.   Psychiatric:         Mood and Affect: " Mood normal.         Behavior: Behavior normal.       Assessment/Plan:  Zenia Canela is a 67 y.o. female who presents today for :        ICD-10-CM ICD-9-CM    1. Routine general medical examination at a health care facility  Z00.00 V70.0       2. BMI 23.0-23.9, adult  Z68.23 V85.1       3. Benign essential hypertension  I10 401.1 hydroCHLOROthiazide (HYDRODIURIL) 12.5 MG Tab      4. History of bariatric surgery  Z98.84 V45.86       5. Bilateral primary osteoarthritis of knee  M17.0 715.16       6. History of total right knee replacement  Z96.651 V43.65       7. Lower extremity venous stasis  I87.8 459.81       8. Osteopenia of both hips  M85.851 733.90     M85.852        9. Varicose veins of leg with pain, left  I83.812 454.8       10. Venous reflux  I87.2 459.81       11. Vitamin D deficiency  E55.9 268.9       12. Medication management  Z79.899 V58.69       13. Encounter for screening mammogram for breast cancer  Z12.31 V76.12 Mammo Digital Screening Bilat      14. Colon cancer screening  Z12.11 V76.51 Case Request Endoscopy: COLONOSCOPY      15. Change in bowel habit  R19.4 787.99 Case Request Endoscopy: COLONOSCOPY      16. Chronic bilateral low back pain with bilateral sciatica  M54.42 724.2 Ambulatory referral/consult to Physical/Occupational Therapy    M54.41 724.3     G89.29 338.29       17. Juvenile idiopathic scoliosis of thoracolumbar region  M41.115 737.30         ADVISED ON DIET/EXERCISE/SLEEP, ROUTINE EYE/DENTAL EXAMS, AND THE IMPORTANCE OF KEEPING UP WITH APPROPRIATE SCREENING TESTS BASED ON AGE AND RISK FACTORS.  COLONOSCOPY DUE AND ORDERED, MAMMOGRAM DUE JULY 2023-ORDERED, IMMUNIZATIONS UP-TO-DATE EXCEPT DUE FOR OMICRON COVID-19 BOOSTER &  WILL CHECK TO CONFIRM SHE RECEIVED PNEUMOVAX 23 THROUGH [facility].  NEXT DEXA DUE 11/2024, PAP NO LONGER INDICATED    OBESITY STATUS POST GASTRIC SLEEVE, NOW ON GLP 1:  Follows with bariatric, lost significant amount of weight following gastric sleeve but started to  regain.  Has done well on weekly GLP 1 injections.  Currently on Mounjaro 10 mg weekly and BMI has normalized at 23.  Trying to maintain this.  Has been checked for vitamin deficiencies over the last couple of years.  Vitamin-D normalized on most recent blood draw.      MILD OSTEOPENIA WITH VITAMIN D DEFICIENCY:  Vitamin-D normalized on 5000 IU D3 daily, continue regular exercise, recheck DEXA 11/ 2024    HYPERTENSION:  Has lost a significant amount of weight so can likely cut back on hydrochlorothiazide from 25 down to 12.5 mg daily.  This should decrease urinary frequency and allow potassium to normalize-currently borderline low at 3.4.  She will monitor her home blood pressures on this lower dose.      VARICOSE VEINS WITH PAIN:  Has venous insufficiency, overall manageable, with tolerable symptoms.  Was having increased pain with varicose vein of left lower extremity.  Recently status post ablation per Cardiology Dr. Fink  with good results, ongoing monitoring.  Elevation/compression as needed.  Maintain improved weight to decrease venous insufficiency symptoms.      SCOLIOSIS WITH CHRONIC BACK PAIN:  Has been good about regular exercise but not keeping up with her home exercise PT program for her back.  Referral placed due to increased pain and to get her back on track.  Patient Instructions   An order for a colonoscopy has been placed. If you have not been contacted within 5-7 days to schedule this procedure, please call GI scheduling at [phone]--[facility] colonoscopy.     PLEASE OBTAIN AN RECENT PNEUMONIA VACCINE RECORDS FOR [facility] SO CAN ADVISE IF ANY ADDITIONAL ARE INDICATED    PLEASE MONITOR BP ON HCTZ 12.5 MG DAILY --LOW DOSE      Follow up in about 1 year (around 5/26/2024) for to follow up on lab results, return as needed for new concerns.

## 2023-05-26 NOTE — PATIENT INSTRUCTIONS
An order for a colonoscopy has been placed. If you have not been contacted within 5-7 days to schedule this procedure, please call GI scheduling at [phone]--[facility] colonoscopy.     PLEASE OBTAIN AN RECENT PNEUMONIA VACCINE RECORDS FOR [facility] SO CAN ADVISE IF ANY ADDITIONAL ARE INDICATED    PLEASE MONITOR BP ON HCTZ 12.5 MG DAILY --LOW DOSE

## 2023-05-26 NOTE — TELEPHONE ENCOUNTER
Care Due:                  Date            Visit Type   Department     Provider  --------------------------------------------------------------------------------                                EP -                              PRIMARY      [facility] FAMILY  Last Visit: 05-      CARE ([facility])   MEDICINE       Vianney Downs  Next Visit: None Scheduled  None         None Found                                                            Last  Test          Frequency    Reason                     Performed    Due Date  --------------------------------------------------------------------------------    Office Visit  12 months..  hydroCHLOROthiazide......  05- 05-    [facility] Embedded Care Due Messages. Reference number: [id number].   5/26/2023 10:18:42 AM CDT

## 2023-05-26 NOTE — TELEPHONE ENCOUNTER
Refill Decision Note   Zenia Canela  is requesting a refill authorization.  Brief Assessment and Rationale for Refill:  Quick Discontinue     Medication Therapy Plan:    Pharmacy is requesting new scripts for the following medications without required information, (sig/ frequency/qty/etc)      Medication Reconciliation Completed: No     Comments: Pharmacies have been requesting medications for patients without required information, (sig, frequency, qty, etc.). In addition, requests are sent for medication(s) pt. are currently not taking, and medications patients have never taken.    We have spoken to the pharmacies about these request types and advised their teams previously that we are unable to assess these New Script requests and require all details for these requests. This is a known issue and has been reported.     Note composed:10:39 AM 05/26/2023

## 2023-06-09 ENCOUNTER — HOSPITAL ENCOUNTER (OUTPATIENT)
Dept: CARDIOLOGY | Facility: HOSPITAL | Age: 68
Discharge: HOME | End: 2023-06-09
Attending: INTERNAL MEDICINE
Payer: MEDICARE

## 2023-06-09 DIAGNOSIS — I87.2 VENOUS INSUFFICIENCY (CHRONIC) (PERIPHERAL): ICD-10-CM

## 2023-06-09 PROCEDURE — 93971 CV US LOWER VENOUS INSUFFICIENCY LEFT (CUPID ONLY): ICD-10-PCS | Mod: 26,LT,, | Performed by: INTERNAL MEDICINE

## 2023-06-09 PROCEDURE — 93971 EXTREMITY STUDY: CPT | Mod: TC,LT

## 2023-06-09 PROCEDURE — 93971 EXTREMITY STUDY: CPT | Mod: 26,LT,, | Performed by: INTERNAL MEDICINE

## 2023-06-12 ENCOUNTER — PATIENT MESSAGE (OUTPATIENT)
Dept: FAMILY MEDICINE | Facility: CLINIC | Age: 68
End: 2023-06-12
Payer: MEDICARE

## 2023-06-12 ENCOUNTER — PATIENT MESSAGE (OUTPATIENT)
Dept: CARDIOLOGY | Facility: CLINIC | Age: 68
End: 2023-06-12
Payer: MEDICARE

## 2023-06-12 ENCOUNTER — TELEPHONE (OUTPATIENT)
Dept: CARDIOLOGY | Facility: CLINIC | Age: 68
End: 2023-06-12
Payer: MEDICARE

## 2023-06-12 DIAGNOSIS — L81.0 POST-INFLAMMATORY HYPERPIGMENTATION: Primary | ICD-10-CM

## 2023-06-12 NOTE — TELEPHONE ENCOUNTER
----- Message from Chichi Moise sent at 6/12/2023  7:48 AM CDT -----  Regarding: Call back  Contact: [phone]  Type:  Needs Medical Advice    Who Called: PT   Symptoms (please be specific): left leg ablation was done 1 month ago and having discomfort and inflame on the ankle   How long has patient had these symptoms:  Wednesday   Would the patient rather a call back or a response via [facility]? Call back   Best Call Back Number:  [phone]  Additional Information:

## 2023-06-13 ENCOUNTER — TELEPHONE (OUTPATIENT)
Dept: FAMILY MEDICINE | Facility: CLINIC | Age: 68
End: 2023-06-13
Payer: MEDICARE

## 2023-06-13 RX ORDER — CLOBETASOL PROPIONATE 0.5 MG/G
CREAM TOPICAL 2 TIMES DAILY
Qty: 60 G | Refills: 0 | Status: SHIPPED | OUTPATIENT
Start: 2023-06-13

## 2023-06-13 NOTE — TELEPHONE ENCOUNTER
Please hold an appointment for this patient this coming Friday in case the rash doesn't respond to the steroid cream I am sending, thanks

## 2023-06-13 NOTE — TELEPHONE ENCOUNTER
Please hold an appointment for patient for this Friday in case her skin condition does not respond to the steroid cream I have sent in, thanks.

## 2023-06-15 ENCOUNTER — TELEPHONE (OUTPATIENT)
Dept: FAMILY MEDICINE | Facility: CLINIC | Age: 68
End: 2023-06-15
Payer: MEDICARE

## 2023-06-15 LAB
LEFT GREAT SAPHENOUS DISTAL THIGH DIA: 0.7 CM
LEFT GREAT SAPHENOUS JUNCTION DIA: 0.46 CM
LEFT GREAT SAPHENOUS MIDDLE THIGH DIA: 0.44 CM
LEFT GREAT SAPHENOUS PROXIMAL CALF DIA: 0.43 CM
LEFT SMALL SAPHENOUS KNEE DIA: 0.58 CM

## 2023-06-15 NOTE — TELEPHONE ENCOUNTER
----- Message from [facility] sent at 6/15/2023 11:47 AM CDT -----  Type:  Needs Medical Advice    Who Called: Pt  Would the patient rather a call back or a response via [facility]? callback  Best Call Back Number: [phone]  Additional Information: Pt requesting callback from office to discuss setting up appt on 06/16/2023

## 2023-06-19 ENCOUNTER — OFFICE VISIT (OUTPATIENT)
Dept: FAMILY MEDICINE | Facility: CLINIC | Age: 68
End: 2023-06-19
Payer: MEDICARE

## 2023-06-19 VITALS
HEIGHT: 67 IN | DIASTOLIC BLOOD PRESSURE: 74 MMHG | TEMPERATURE: 98 F | SYSTOLIC BLOOD PRESSURE: 110 MMHG | HEART RATE: 81 BPM | WEIGHT: 149.06 LBS | OXYGEN SATURATION: 99 % | BODY MASS INDEX: 23.39 KG/M2

## 2023-06-19 DIAGNOSIS — I87.2 VENOUS STASIS DERMATITIS, UNSPECIFIED LATERALITY: ICD-10-CM

## 2023-06-19 DIAGNOSIS — T81.72XS: ICD-10-CM

## 2023-06-19 DIAGNOSIS — I83.892 SYMPTOMATIC VARICOSE VEINS, LEFT: Primary | ICD-10-CM

## 2023-06-19 DIAGNOSIS — I10 BENIGN ESSENTIAL HYPERTENSION: ICD-10-CM

## 2023-06-19 PROCEDURE — 3044F PR MOST RECENT HEMOGLOBIN A1C LEVEL <7.0%: ICD-10-PCS | Mod: CPTII,S$GLB,, | Performed by: FAMILY MEDICINE

## 2023-06-19 PROCEDURE — 3078F PR MOST RECENT DIASTOLIC BLOOD PRESSURE < 80 MM HG: ICD-10-PCS | Mod: CPTII,S$GLB,, | Performed by: FAMILY MEDICINE

## 2023-06-19 PROCEDURE — 1159F PR MEDICATION LIST DOCUMENTED IN MEDICAL RECORD: ICD-10-PCS | Mod: CPTII,S$GLB,, | Performed by: FAMILY MEDICINE

## 2023-06-19 PROCEDURE — 1101F PR PT FALLS ASSESS DOC 0-1 FALLS W/OUT INJ PAST YR: ICD-10-PCS | Mod: CPTII,S$GLB,, | Performed by: FAMILY MEDICINE

## 2023-06-19 PROCEDURE — 3288F PR FALLS RISK ASSESSMENT DOCUMENTED: ICD-10-PCS | Mod: CPTII,S$GLB,, | Performed by: FAMILY MEDICINE

## 2023-06-19 PROCEDURE — 99213 OFFICE O/P EST LOW 20 MIN: CPT | Mod: S$GLB,,, | Performed by: FAMILY MEDICINE

## 2023-06-19 PROCEDURE — 99999 PR PBB SHADOW E&M-EST. PATIENT-LVL IV: ICD-10-PCS | Mod: PBBFAC,,, | Performed by: FAMILY MEDICINE

## 2023-06-19 PROCEDURE — 99213 PR OFFICE/OUTPT VISIT, EST, LEVL III, 20-29 MIN: ICD-10-PCS | Mod: S$GLB,,, | Performed by: FAMILY MEDICINE

## 2023-06-19 PROCEDURE — 3078F DIAST BP <80 MM HG: CPT | Mod: CPTII,S$GLB,, | Performed by: FAMILY MEDICINE

## 2023-06-19 PROCEDURE — 3008F PR BODY MASS INDEX (BMI) DOCUMENTED: ICD-10-PCS | Mod: CPTII,S$GLB,, | Performed by: FAMILY MEDICINE

## 2023-06-19 PROCEDURE — 3288F FALL RISK ASSESSMENT DOCD: CPT | Mod: CPTII,S$GLB,, | Performed by: FAMILY MEDICINE

## 2023-06-19 PROCEDURE — 3044F HG A1C LEVEL LT 7.0%: CPT | Mod: CPTII,S$GLB,, | Performed by: FAMILY MEDICINE

## 2023-06-19 PROCEDURE — 1160F RVW MEDS BY RX/DR IN RCRD: CPT | Mod: CPTII,S$GLB,, | Performed by: FAMILY MEDICINE

## 2023-06-19 PROCEDURE — 3008F BODY MASS INDEX DOCD: CPT | Mod: CPTII,S$GLB,, | Performed by: FAMILY MEDICINE

## 2023-06-19 PROCEDURE — 1160F PR REVIEW ALL MEDS BY PRESCRIBER/CLIN PHARMACIST DOCUMENTED: ICD-10-PCS | Mod: CPTII,S$GLB,, | Performed by: FAMILY MEDICINE

## 2023-06-19 PROCEDURE — 1126F AMNT PAIN NOTED NONE PRSNT: CPT | Mod: CPTII,S$GLB,, | Performed by: FAMILY MEDICINE

## 2023-06-19 PROCEDURE — 3074F SYST BP LT 130 MM HG: CPT | Mod: CPTII,S$GLB,, | Performed by: FAMILY MEDICINE

## 2023-06-19 PROCEDURE — 1126F PR PAIN SEVERITY QUANTIFIED, NO PAIN PRESENT: ICD-10-PCS | Mod: CPTII,S$GLB,, | Performed by: FAMILY MEDICINE

## 2023-06-19 PROCEDURE — 3074F PR MOST RECENT SYSTOLIC BLOOD PRESSURE < 130 MM HG: ICD-10-PCS | Mod: CPTII,S$GLB,, | Performed by: FAMILY MEDICINE

## 2023-06-19 PROCEDURE — 99999 PR PBB SHADOW E&M-EST. PATIENT-LVL IV: CPT | Mod: PBBFAC,,, | Performed by: FAMILY MEDICINE

## 2023-06-19 PROCEDURE — 1159F MED LIST DOCD IN RCRD: CPT | Mod: CPTII,S$GLB,, | Performed by: FAMILY MEDICINE

## 2023-06-19 PROCEDURE — 1101F PT FALLS ASSESS-DOCD LE1/YR: CPT | Mod: CPTII,S$GLB,, | Performed by: FAMILY MEDICINE

## 2023-06-19 NOTE — PROGRESS NOTES
" Office Visit    Patient Name: Zenia Canela    : 1955  MRN: [id number]    Subjective:  Zenia is a 67 y.o. female who presents today for:    Rash (Raised, left leg, couple weeks)    59-YEAR-OLD FEMALE RECENTLY SEEN BY ME FOR ANNUAL PHYSICAL 2023    PAST MEDICAL HISTORY, SURGICAL/SOCIAL/FAMILY HISTORY REVIEWED AS PER CHART, WITH PERTINENT FINDINGS INCLUDED IN HISTORY SECTION OF NOTE.     Current Medications    Medication List with Changes/Refills   Current Medications    CHOLECALCIFEROL, VITAMIN D3, 125 MCG (5,000 UNIT) CAPSULE    Take 1 capsule (5,000 Units total) by mouth daily with breakfast.    CLOBETASOL (TEMOVATE) 0.05 % CREAM    Apply topically 2 (two) times daily. Apply to irritated area of skin of leg    HYDROCHLOROTHIAZIDE (HYDRODIURIL) 12.5 MG TAB    Take 1 tablet (12.5 mg total) by mouth once daily.    MULTIVITAMIN CAPSULE    Take 1 capsule by mouth once daily.    TIRZEPATIDE 10 MG/0.5 ML PNIJ    Inject 10 mg into the skin every 7 days.   Discontinued Medications    TIRZEPATIDE (MOUNJARO) 7.5 MG/0.5 ML PNIJ    INJECT 1 SYRINGE INTO THE SKIN EVERY WEEK       Allergies   Review of patient's allergies indicates:   Allergen Reactions    Phenytoin sodium extended      Other reaction(s): jittery         Review of Systems (Pertinent positives)  Review of Systems    /74 (BP Location: Right arm, Patient Position: Sitting, BP Method: Medium (Manual))   Pulse 81   Temp 98.1 °F (36.7 °C) (Oral)   Ht 5' 7" (1.702 m)   Wt 67.6 kg (149 lb 0.5 oz)   LMP  (LMP Unknown)   SpO2 99%   BMI 23.34 kg/m²     Physical Exam      Assessment/Plan:  Zenia Canela is a 67 y.o. female who presents today for :      No diagnosis found.      There are no Patient Instructions on file for this visit.      No follow-ups on file.    "

## 2023-06-19 NOTE — PROGRESS NOTES
" Office Visit    Patient Name: Zenia Canela    : 1955  MRN: [id number]    Subjective:  Zenia is a 67 y.o. female who presents today for:    Rash (Raised, left leg, couple weeks)    67-year-old female patient of mine recently seen by me for annual physical 2023 who presents today for urgent care visit for evaluation of a "rash."     Chronic conditions that include HTN controlled on HCTZ 12.5 mg daily, ISABELLA stable with use of premarin vaginal & s/p wt loss following gastric sleeve, overweight BMI--> NORMALIZED ON MOUNJARO FOLLOWING BARIATRIC SURGERY, vit D deficiency.     Had a L varicose Vein Ablation 23 w/ Dr Guillen  associated pain and is recovering very well overall with significantly less swelling of the left lower extremity, however, a couple of weeks ago she noticed a localized area of swelling under the skin associated with tenderness to palpation.  If she happens to bump against it or pushing against it she will have a pain.  There was some redness of the skin but she has been applying a steroid cream with improvement.  No longer with any redness, warmth or drainage.      The affected area has been stable in size and is not increasing.    PAST MEDICAL HISTORY, SURGICAL/SOCIAL/FAMILY HISTORY REVIEWED AS PER CHART, WITH PERTINENT FINDINGS INCLUDED IN HISTORY SECTION OF NOTE.     Current Medications    Medication List with Changes/Refills   Current Medications    CHOLECALCIFEROL, VITAMIN D3, 125 MCG (5,000 UNIT) CAPSULE    Take 1 capsule (5,000 Units total) by mouth daily with breakfast.    CLOBETASOL (TEMOVATE) 0.05 % CREAM    Apply topically 2 (two) times daily. Apply to irritated area of skin of leg    HYDROCHLOROTHIAZIDE (HYDRODIURIL) 12.5 MG TAB    Take 1 tablet (12.5 mg total) by mouth once daily.    MULTIVITAMIN CAPSULE    Take 1 capsule by mouth once daily.    TIRZEPATIDE 10 MG/0.5 ML PNIJ    Inject 10 mg into the skin every 7 days.   Discontinued Medications    TIRZEPATIDE " "(MOUNJARO) 7.5 MG/0.5 ML PNIJ    INJECT 1 SYRINGE INTO THE SKIN EVERY WEEK       Allergies   Review of patient's allergies indicates:   Allergen Reactions    Phenytoin sodium extended      Other reaction(s): jittery         Review of Systems (Pertinent positives)  Review of Systems   Constitutional:  Negative for unexpected weight change.   Cardiovascular:  Positive for leg swelling (Improving following vein ablation).   Genitourinary:  Negative for urgency.   Skin:  Negative for color change, rash and wound.     /74 (BP Location: Right arm, Patient Position: Sitting, BP Method: Medium (Manual))   Pulse 81   Temp 98.1 °F (36.7 °C) (Oral)   Ht 5' 7" (1.702 m)   Wt 67.6 kg (149 lb 0.5 oz)   LMP  (LMP Unknown)   SpO2 99%   BMI 23.34 kg/m²     Physical Exam  Vitals reviewed.   Constitutional:       General: She is not in acute distress.     Appearance: Normal appearance. She is well-developed.   HENT:      Head: Normocephalic and atraumatic.   Eyes:      Conjunctiva/sclera: Conjunctivae normal.   Pulmonary:      Effort: Pulmonary effort is normal.   Musculoskeletal:      Right lower leg: No edema.      Left lower leg: Edema (chronic, non-pitting, improving following EVLT) present.   Skin:     General: Skin is warm and dry.      Findings: Lesion present.          Neurological:      General: No focal deficit present.      Mental Status: She is alert and oriented to person, place, and time.   Psychiatric:         Mood and Affect: Mood normal.         Behavior: Behavior normal.         Assessment/Plan:  Zenia Canela is a 67 y.o. female who presents today for :        ICD-10-CM ICD-9-CM    1. Symptomatic varicose veins, left  I83.892 454.8       2. Venous stasis dermatitis, unspecified laterality  I87.2 454.1       3. Complication of vein following a procedure, NEC, sequela  T81.72XS 909.3     EVLT L Brijesh ext 5/11/23      4. Benign essential hypertension  I10 401.1         Suspected sequelae of recent EVLT " with localized area of subcutaneous hematoma at site of previous varicose vein distention.    No complications observed-no evidence of thrombophlebitis/skin breakdown.      Have advise elevation/ice to the affected area, but otherwise no concerns.      Blood pressure remains in goal range on half dose-12.5 mg of hydrochlorothiazide daily and this has reduced urinary frequency/urgency.    Patient Instructions   ICE/ ELEVATE FOR 2 15 MINUTE INCREMENTS DAILY      Follow up for return as needed for new concerns.

## 2023-07-11 DIAGNOSIS — E55.9 VITAMIN D DEFICIENCY: ICD-10-CM

## 2023-07-11 DIAGNOSIS — Z98.84 HISTORY OF BARIATRIC SURGERY: ICD-10-CM

## 2023-07-11 RX ORDER — CHOLECALCIFEROL (VITAMIN D3) 125 MCG
5000 CAPSULE ORAL
Qty: 100 CAPSULE | Refills: 4 | Status: SHIPPED | OUTPATIENT
Start: 2023-07-11

## 2023-07-25 ENCOUNTER — TELEPHONE (OUTPATIENT)
Dept: GASTROENTEROLOGY | Facility: CLINIC | Age: 68
End: 2023-07-25
Payer: MEDICARE

## 2023-07-25 NOTE — TELEPHONE ENCOUNTER
----- Message from Lluvia Macario sent at 7/25/2023  9:45 AM CDT -----  Contact: pt  Type:  Reschedule Colonoscopy     Who Called:pt    Does the patient know what this is regarding?:reschedule colonoscopy   Would the patient rather a call back or a response via [facility]? Call   Best Call Back Number:[phone]  Additional Information: \

## 2023-07-28 ENCOUNTER — PATIENT MESSAGE (OUTPATIENT)
Dept: CARDIOLOGY | Facility: CLINIC | Age: 68
End: 2023-07-28
Payer: MEDICARE

## 2023-09-15 ENCOUNTER — TELEPHONE (OUTPATIENT)
Dept: ENDOSCOPY | Facility: HOSPITAL | Age: 68
End: 2023-09-15
Payer: MEDICARE

## 2023-09-15 NOTE — TELEPHONE ENCOUNTER
Spoke with patient about arrival time @ 0800.   Colon/Golytely    Prep instructions reviewed: the day before the procedure, follow a clear liquid diet all day, then start the first 1/2 of prep at 5pm and take 2nd 1/2 of prep @ 0300.  Pt must be completely NPO when prep completed @ 0500.  Golytely inst/arrival time sent to portal.            Medications: Do not take Insulin or oral diabetic medications the day of the procedure.  Take as prescribed: heart, seizure and blood pressure medication in the morning with a sip of water (less than an ounce).  Take any breathing medications and bring inhalers to hospital with you Leave all valuables and jewelry at home.     Wear comfortable clothes to procedure to change into hospital gown You cannot drive for 24 hours after your procedure because you will receive sedation for your procedure to make you comfortable.  A ride must be provided at discharge.

## 2023-09-19 ENCOUNTER — ANESTHESIA (OUTPATIENT)
Dept: ENDOSCOPY | Facility: HOSPITAL | Age: 68
End: 2023-09-19
Payer: MEDICARE

## 2023-09-19 ENCOUNTER — ANESTHESIA EVENT (OUTPATIENT)
Dept: ENDOSCOPY | Facility: HOSPITAL | Age: 68
End: 2023-09-19
Payer: MEDICARE

## 2023-09-19 ENCOUNTER — HOSPITAL ENCOUNTER (OUTPATIENT)
Facility: HOSPITAL | Age: 68
Discharge: HOME | End: 2023-09-19
Attending: INTERNAL MEDICINE | Admitting: INTERNAL MEDICINE
Payer: MEDICARE

## 2023-09-19 VITALS
RESPIRATION RATE: 18 BRPM | TEMPERATURE: 97 F | DIASTOLIC BLOOD PRESSURE: 67 MMHG | SYSTOLIC BLOOD PRESSURE: 104 MMHG | HEART RATE: 66 BPM | OXYGEN SATURATION: 100 % | WEIGHT: 148 LBS | HEIGHT: 67 IN | BODY MASS INDEX: 23.23 KG/M2

## 2023-09-19 DIAGNOSIS — Z12.11 COLON CANCER SCREENING: ICD-10-CM

## 2023-09-19 PROCEDURE — D9220A PRA ANESTHESIA: Mod: ANES,,, | Performed by: STUDENT IN AN ORGANIZED HEALTH CARE EDUCATION/TRAINING PROGRAM

## 2023-09-19 PROCEDURE — 63600175 PHARM REV CODE 636 W HCPCS: Performed by: NURSE ANESTHETIST, CERTIFIED REGISTERED

## 2023-09-19 PROCEDURE — D9220A PRA ANESTHESIA: Mod: CRNA,,, | Performed by: NURSE ANESTHETIST, CERTIFIED REGISTERED

## 2023-09-19 PROCEDURE — 37000009 HC ANESTHESIA EA ADD 15 MINS: Performed by: INTERNAL MEDICINE

## 2023-09-19 PROCEDURE — D9220A PRA ANESTHESIA: ICD-10-PCS | Mod: ANES,,, | Performed by: STUDENT IN AN ORGANIZED HEALTH CARE EDUCATION/TRAINING PROGRAM

## 2023-09-19 PROCEDURE — D9220A PRA ANESTHESIA: ICD-10-PCS | Mod: CRNA,,, | Performed by: NURSE ANESTHETIST, CERTIFIED REGISTERED

## 2023-09-19 PROCEDURE — 25000003 PHARM REV CODE 250: Performed by: NURSE ANESTHETIST, CERTIFIED REGISTERED

## 2023-09-19 PROCEDURE — G0121 COLON CA SCRN NOT HI RSK IND: HCPCS | Performed by: INTERNAL MEDICINE

## 2023-09-19 PROCEDURE — 37000008 HC ANESTHESIA 1ST 15 MINUTES: Performed by: INTERNAL MEDICINE

## 2023-09-19 PROCEDURE — G0121 COLON CA SCRN NOT HI RSK IND: HCPCS | Mod: ,,, | Performed by: INTERNAL MEDICINE

## 2023-09-19 PROCEDURE — G0121 COLON CA SCRN NOT HI RSK IND: ICD-10-PCS | Mod: ,,, | Performed by: INTERNAL MEDICINE

## 2023-09-19 PROCEDURE — 25000003 PHARM REV CODE 250: Performed by: INTERNAL MEDICINE

## 2023-09-19 RX ORDER — SODIUM CHLORIDE 9 MG/ML
INJECTION, SOLUTION INTRAVENOUS CONTINUOUS
Status: DISCONTINUED | OUTPATIENT
Start: 2023-09-19 | End: 2023-09-19 | Stop reason: HOSPADM

## 2023-09-19 RX ORDER — PROPOFOL 10 MG/ML
VIAL (ML) INTRAVENOUS
Status: DISCONTINUED | OUTPATIENT
Start: 2023-09-19 | End: 2023-09-19

## 2023-09-19 RX ORDER — PROPOFOL 10 MG/ML
VIAL (ML) INTRAVENOUS CONTINUOUS PRN
Status: DISCONTINUED | OUTPATIENT
Start: 2023-09-19 | End: 2023-09-19

## 2023-09-19 RX ORDER — SODIUM CHLORIDE 0.9 % (FLUSH) 0.9 %
10 SYRINGE (ML) INJECTION
Status: DISCONTINUED | OUTPATIENT
Start: 2023-09-19 | End: 2023-09-19 | Stop reason: HOSPADM

## 2023-09-19 RX ORDER — LIDOCAINE HYDROCHLORIDE 20 MG/ML
INJECTION, SOLUTION EPIDURAL; INFILTRATION; INTRACAUDAL; PERINEURAL
Status: DISCONTINUED | OUTPATIENT
Start: 2023-09-19 | End: 2023-09-19

## 2023-09-19 RX ADMIN — LIDOCAINE HYDROCHLORIDE 50 MG: 20 INJECTION, SOLUTION EPIDURAL; INFILTRATION; INTRACAUDAL; PERINEURAL at 08:09

## 2023-09-19 RX ADMIN — PROPOFOL 50 MG: 10 INJECTION, EMULSION INTRAVENOUS at 08:09

## 2023-09-19 RX ADMIN — SODIUM CHLORIDE: 9 INJECTION, SOLUTION INTRAVENOUS at 08:09

## 2023-09-19 RX ADMIN — PROPOFOL 150 MCG/KG/MIN: 10 INJECTION, EMULSION INTRAVENOUS at 08:09

## 2023-09-19 NOTE — TRANSFER OF CARE
"Anesthesia Transfer of Care Note    Patient: Zenia Canela    Procedure(s) Performed: Procedure(s) (LRB):  COLONOSCOPY (N/A)    Patient location: GI    Anesthesia Type: general    Transport from OR: Transported from OR on room air with adequate spontaneous ventilation    Post pain: adequate analgesia    Post assessment: no apparent anesthetic complications and tolerated procedure well    Post vital signs: stable    Level of consciousness: awake and alert    Nausea/Vomiting: no nausea/vomiting    Complications: none    Transfer of care protocol was followed      Last vitals:   Visit Vitals  /78 (BP Location: Left arm, Patient Position: Lying)   Pulse 74   Temp 36.4 °C (97.6 °F) (Skin)   Resp 18   Ht 5' 7" (1.702 m)   Wt 67.1 kg (148 lb)   LMP  (LMP Unknown)   SpO2 99%   Breastfeeding No   BMI 23.18 kg/m²     "

## 2023-09-19 NOTE — ANESTHESIA PREPROCEDURE EVALUATION
09/19/2023  Zenia Canela is a 68 y.o., female.      Pre-op Assessment    I have reviewed the Patient Summary Reports.    I have reviewed the NPO Status.   I have reviewed the Medications.     Review of Systems  Anesthesia Hx:   Denies Personal Hx of Anesthesia complications.   Social:  Non-Smoker    Cardiovascular:   Exercise tolerance: good Hypertension    Pulmonary:  Pulmonary Normal    Renal/:  Renal/ Normal     Hepatic/GI:  Hepatic/GI Normal    Neurological:  Neurology Normal    Endocrine:  Endocrine Normal        Physical Exam  General: Well nourished and Cooperative    Airway:  Mallampati: I   Mouth Opening: Normal  TM Distance: Normal  Tongue: Normal  Neck ROM: Normal ROM    Dental:  Intact    Chest/Lungs:  Normal Respiratory Rate        Anesthesia Plan  Type of Anesthesia, risks & benefits discussed:    Anesthesia Type: Gen Natural Airway  Intra-op Monitoring Plan: Standard ASA Monitors  Post Op Pain Control Plan: multimodal analgesia  Induction:  IV  Informed Consent: Informed consent signed with the Patient and all parties understand the risks and agree with anesthesia plan.  All questions answered.   ASA Score: 2  Day of Surgery Review of History & Physical: H&P Update referred to the surgeon/provider.    Ready For Surgery From Anesthesia Perspective.     .

## 2023-09-19 NOTE — ANESTHESIA POSTPROCEDURE EVALUATION
Anesthesia Post Evaluation    Patient: Zenia Canela    Procedure(s) Performed: Procedure(s) (LRB):  COLONOSCOPY (N/A)    Final Anesthesia Type: general      Patient location during evaluation: PACU  Patient participation: Yes- Able to Participate  Level of consciousness: awake and alert  Post-procedure vital signs: reviewed and stable  Pain management: adequate  Airway patency: patent    PONV status at discharge: No PONV  Anesthetic complications: no      Cardiovascular status: stable  Respiratory status: room air  Hydration status: euvolemic  Follow-up not needed.          Vitals Value Taken Time   BP 99/54 09/19/23 0934   Temp 36.3 °C (97.3 °F) 09/19/23 0920   Pulse 67 09/19/23 0934   Resp 18 09/19/23 0934   SpO2 100 % 09/19/23 0934         No case tracking events are documented in the log.      Pain/Apple Score: Apple Score: 10 (9/19/2023  9:35 AM)

## 2023-09-19 NOTE — PROVATION PATIENT INSTRUCTIONS
Discharge Summary/Instructions after an Endoscopic Procedure  Patient Name: Zenia Canela  Patient MRN: [id number]  Patient YOB: 1955 Tuesday, September 19, 2023  Jose A Powers MD  Dear patient,  As a result of recent federal legislation (The Federal Cures Act), you may   receive lab or pathology results from your procedure in your [facility]   account before your physician is able to contact you. Your physician or   their representative will relay the results to you with their   recommendations at their soonest availability.  Thank you,  Your health is very important to us during the Covid Crisis. Following your   procedure today, you will receive a daily text for 2 weeks asking about   signs or symptoms of Covid 19.  Please respond to this text when you   receive it so we can follow up and keep you as safe as possible.   RESTRICTIONS:  During your procedure today, you received medications for sedation.  These   medications may affect your judgment, balance and coordination.  Therefore,   for 24 hours, you have the following restrictions:   - DO NOT drive a car, operate machinery, make legal/financial decisions,   sign important papers or drink alcohol.    ACTIVITY:  Today: no heavy lifting, straining or running due to procedural   sedation/anesthesia.  The following day: return to full activity including work.  DIET:  Eat and drink normally unless instructed otherwise.     TREATMENT FOR COMMON SIDE EFFECTS:  - Mild abdominal pain, nausea, belching, bloating or excessive gas:  rest,   eat lightly and use a heating pad.  - Sore Throat: treat with throat lozenges and/or gargle with warm salt   water.  - Because air was used during the procedure, expelling large amounts of air   from your rectum or belching is normal.  - If a bowel prep was taken, you may not have a bowel movement for 1-3 days.    This is normal.  SYMPTOMS TO WATCH FOR AND REPORT TO YOUR PHYSICIAN:  1. Abdominal pain or bloating, other  than gas cramps.  2. Chest pain.  3. Back pain.  4. Signs of infection such as: chills or fever occurring within 24 hours   after the procedure.  5. Rectal bleeding, which would show as bright red, maroon, or black stools.   (A tablespoon of blood from the rectum is not serious, especially if   hemorrhoids are present.)  6. Vomiting.  7. Weakness or dizziness.  GO DIRECTLY TO THE NEAREST EMERGENCY ROOM IF YOU HAVE ANY OF THE FOLLOWING:      Difficulty breathing              Chills and/or fever over 101 F   Persistent vomiting and/or vomiting blood   Severe abdominal pain   Severe chest pain   Black, tarry stools   Bleeding- more than one tablespoon   Any other symptom or condition that you feel may need urgent attention  Your doctor recommends these additional instructions:  If any biopsies were taken, your doctors clinic will contact you in 1 to 2   weeks with any results.  - Discharge patient to home.   - Patient has a contact number available for emergencies.  The signs and   symptoms of potential delayed complications were discussed with the   patient.  Return to normal activities tomorrow.  Written discharge   instructions were provided to the patient.   - Resume previous diet.   - Continue present medications.   - Repeat colonoscopy in 10 years for screening purposes (would have risk /   benefit discussion prior to scheduling).  For questions, problems or results please call your physician - Jose A Powers MD.  EMERGENCY PHONE NUMBER: [phone],  LAB RESULTS: [phone]  IF A COMPLICATION OR EMERGENCY SITUATION ARISES AND YOU ARE UNABLE TO REACH   YOUR PHYSICIAN - GO DIRECTLY TO THE EMERGENCY ROOM.  Jose A Powers MD  9/19/2023 9:18:20 AM  This report has been verified and signed electronically.  Dear patient,  As a result of recent federal legislation (The Federal Cures Act), you may   receive lab or pathology results from your procedure in your [facility]   account before your physician is able  to contact you. Your physician or   their representative will relay the results to you with their   recommendations at their soonest availability.  Thank you,  PROVATION

## 2023-09-19 NOTE — H&P
Short Stay Endoscopy History and Physical    PCP - Vianney Downs MD    Procedure - Colonoscopy  ASA - per anesthesia  Mallampati - per anesthesia  History of Anesthesia problems - no  Family history Anesthesia problems - no   Plan of anesthesia - General    HPI:  This is a 68 y.o. female here for evaluation of : asymptomatic screening exam      ROS:  Constitutional: No fevers, chills, No weight loss  CV: No chest pain  Pulm: No cough, No shortness of breath  GI: see HPI  Derm: No rash    Medical History:  has a past medical history of Benign essential hypertension (2012), Bilateral primary osteoarthritis of knee (2021), and Venous insufficiency (2012).    Surgical History:  has a past surgical history that includes Hernia repair;  section, low transverse; Cholecystectomy; Carpal tunnel release; Bariatric Surgery; and Ablation (Left, 2023).    Family History: family history includes COPD in her mother; Cataracts in her mother and sister; Heart attack in her father; Heart disease in her unknown relative; Hyperlipidemia in her unknown relative; Stroke in her father.. Otherwise no colon cancer, inflammatory bowel disease, or GI malignancies.    Social History:  reports that she has never smoked. She has never used smokeless tobacco. She reports that she does not drink alcohol and does not use drugs.    Review of patient's allergies indicates:   Allergen Reactions    Phenytoin sodium extended      Other reaction(s): jittery       Medications:   Facility-Administered Medications Prior to Admission   Medication Dose Route Frequency Provider Last Rate Last Admin    polidocanol 1 % (20 mg/2 mL) Soln 2 mL  2 mL Intravenous Q30 Days Long Griggs MD         Medications Prior to Admission   Medication Sig Dispense Refill Last Dose    cholecalciferol, vitamin D3, 125 mcg (5,000 unit) capsule TAKE 1 CAPSULE (5,000 UNITS TOTAL) BY MOUTH DAILY WITH BREAKFAST. 100 capsule 4  9/18/2023    hydroCHLOROthiazide (HYDRODIURIL) 12.5 MG Tab Take 1 tablet (12.5 mg total) by mouth once daily. 90 tablet 4 9/19/2023 at 0600    multivitamin capsule Take 1 capsule by mouth once daily.   9/18/2023    clobetasoL (TEMOVATE) 0.05 % cream Apply topically 2 (two) times daily. Apply to irritated area of skin of leg 60 g 0     tirzepatide 10 mg/0.5 mL PnIj Inject 1 syringe into the skin once weekly. 4 pen 3 9/6/2023         Physical Exam:    Vital Signs:   Vitals:    09/19/23 0827   BP: 137/78   Pulse: 74   Resp: 18   Temp: 97.6 °F (36.4 °C)       General Appearance: Well appearing in no acute distress  Eyes:    No scleral icterus  ENT: Neck supple, Lips, mucosa, and tongue normal; teeth and gums normal  Abdomen: Soft, non tender, non distended with positive bowel sounds. No hepatosplenomegaly, ascites, or mass.  Extremities: 2+ pulses, no clubbing, cyanosis or edema  Skin: No rash      Labs:  Lab Results   Component Value Date    WBC 4.81 05/19/2023    HGB 13.7 05/19/2023    HCT 40.0 05/19/2023     05/19/2023    CHOL 186 05/19/2023    TRIG 55 05/19/2023    HDL 91 (H) 05/19/2023    ALT 17 05/19/2023    AST 19 05/19/2023     05/19/2023    K 3.4 (L) 05/19/2023     05/19/2023    CREATININE 1.0 05/19/2023    BUN 22 05/19/2023    CO2 31 (H) 05/19/2023    TSH 1.764 05/19/2023    INR 0.9 01/24/2020    HGBA1C 4.9 05/19/2023       I have explained the risks and benefits of endoscopy procedures to the patient including but not limited to bleeding, perforation, infection, and death.  The patient was asked if they understand and allowed to ask any further questions to their satisfaction.    Jose A Powers MD

## 2024-03-27 ENCOUNTER — TELEPHONE (OUTPATIENT)
Dept: FAMILY MEDICINE | Facility: CLINIC | Age: 69
End: 2024-03-27
Payer: MEDICARE

## 2024-03-27 DIAGNOSIS — Z00.00 ROUTINE GENERAL MEDICAL EXAMINATION AT A HEALTH CARE FACILITY: ICD-10-CM

## 2024-03-27 DIAGNOSIS — E55.9 VITAMIN D DEFICIENCY: ICD-10-CM

## 2024-03-27 DIAGNOSIS — Z79.899 MEDICATION MANAGEMENT: ICD-10-CM

## 2024-03-27 DIAGNOSIS — Z98.84 HISTORY OF BARIATRIC SURGERY: ICD-10-CM

## 2024-03-27 DIAGNOSIS — I10 BENIGN ESSENTIAL HYPERTENSION: Primary | ICD-10-CM

## 2024-03-27 NOTE — TELEPHONE ENCOUNTER
----- Message from Lazara Chaparro sent at 3/27/2024  9:18 AM CDT -----  .Type:  Sooner Apoointment Request    Caller is requesting a sooner appointment.  Caller declined first available appointment listed below.  Caller will not accept being placed on the waitlist and is requesting a message be sent to doctor.  Name of Caller: pt  When is the first available appointment? SEPT 16  Symptoms: ANNUAL  Would the patient rather a call back or a response via [facility]?  PORTAL  Best Call Back Number:   Additional Information:  NEEDS LABS AS WELL LAST ANNUAL MAY 25TH. GO AHEAD AND BOOK HER AND SHE WILL SEE IT ON PORTAL

## 2024-03-27 NOTE — TELEPHONE ENCOUNTER
Ordered CMP, CBC, lipid panel, A1c, and TSH. Do you want to order any additional labs?    Scheduled lab and annual appointment.

## 2024-03-28 NOTE — TELEPHONE ENCOUNTER
Please add these additional labs (she has a gastric bypass history) to the ones already ordered for prior to her annual, thanks!

## 2024-06-01 DIAGNOSIS — I10 BENIGN ESSENTIAL HYPERTENSION: ICD-10-CM

## 2024-06-01 NOTE — TELEPHONE ENCOUNTER
Care Due:                  Date            Visit Type   Department     Provider  --------------------------------------------------------------------------------                                EP -                              [facility]  Last Visit: 06-      CARE ([facility])   HALLIE Downs                              [facility]  Next Visit: 08-      CARE ([facility])   HALLIE Downs                                                            Last  Test          Frequency    Reason                     Performed    Due Date  --------------------------------------------------------------------------------    CMP.........  12 months..  cholecalciferol,,          05- 05-                             hydroCHLOROthiazide......    Vitamin D...  12 months..  cholecalciferol,.........  05- 05-    [facility] Embedded Care Due Messages. Reference number: [id number].   6/01/2024 7:53:52 AM CDT

## 2024-06-02 RX ORDER — HYDROCHLOROTHIAZIDE 12.5 MG/1
12.5 TABLET ORAL
Qty: 90 TABLET | Refills: 4 | Status: SHIPPED | OUTPATIENT
Start: 2024-06-02

## 2024-06-02 NOTE — TELEPHONE ENCOUNTER
Refill Routing Note   Medication(s) are not appropriate for processing by [facility] for the following reason(s):        Required labs outdated    [facility] action(s):  Defer        Medication Therapy Plan: FLOS      Appointments  past 12m or future 3m with PCP    Date Provider   Last Visit   6/19/2023 Vianney Downs MD   Next Visit   8/16/2024 Vianney Downs MD   ED visits in past 90 days: 0        Note composed:2:20 AM 06/02/2024

## 2024-08-16 ENCOUNTER — OFFICE VISIT (OUTPATIENT)
Dept: FAMILY MEDICINE | Facility: CLINIC | Age: 69
End: 2024-08-16
Payer: MEDICARE

## 2024-08-16 VITALS
HEART RATE: 70 BPM | TEMPERATURE: 98 F | WEIGHT: 160.5 LBS | DIASTOLIC BLOOD PRESSURE: 74 MMHG | SYSTOLIC BLOOD PRESSURE: 134 MMHG | BODY MASS INDEX: 25.19 KG/M2 | OXYGEN SATURATION: 100 % | HEIGHT: 67 IN

## 2024-08-16 DIAGNOSIS — M54.41 CHRONIC BILATERAL LOW BACK PAIN WITH BILATERAL SCIATICA: ICD-10-CM

## 2024-08-16 DIAGNOSIS — M85.852 OSTEOPENIA OF BOTH HIPS: ICD-10-CM

## 2024-08-16 DIAGNOSIS — I10 BENIGN ESSENTIAL HYPERTENSION: ICD-10-CM

## 2024-08-16 DIAGNOSIS — M41.115 JUVENILE IDIOPATHIC SCOLIOSIS OF THORACOLUMBAR REGION: ICD-10-CM

## 2024-08-16 DIAGNOSIS — M85.851 OSTEOPENIA OF BOTH HIPS: ICD-10-CM

## 2024-08-16 DIAGNOSIS — M54.42 CHRONIC BILATERAL LOW BACK PAIN WITH BILATERAL SCIATICA: ICD-10-CM

## 2024-08-16 DIAGNOSIS — Z00.00 ROUTINE GENERAL MEDICAL EXAMINATION AT A HEALTH CARE FACILITY: Primary | ICD-10-CM

## 2024-08-16 DIAGNOSIS — Z29.11 NEED FOR RSV IMMUNIZATION: ICD-10-CM

## 2024-08-16 DIAGNOSIS — Z98.84 HISTORY OF BARIATRIC SURGERY: ICD-10-CM

## 2024-08-16 DIAGNOSIS — Z96.651 HISTORY OF TOTAL RIGHT KNEE REPLACEMENT: ICD-10-CM

## 2024-08-16 DIAGNOSIS — Z23 NEED FOR VACCINATION WITH 20-POLYVALENT PNEUMOCOCCAL CONJUGATE VACCINE: ICD-10-CM

## 2024-08-16 DIAGNOSIS — G89.29 CHRONIC BILATERAL LOW BACK PAIN WITH BILATERAL SCIATICA: ICD-10-CM

## 2024-08-16 DIAGNOSIS — Z12.31 SCREENING MAMMOGRAM FOR BREAST CANCER: ICD-10-CM

## 2024-08-16 DIAGNOSIS — E66.3 OVERWEIGHT (BMI 25.0-29.9): ICD-10-CM

## 2024-08-16 PROBLEM — I80.02 THROMBOPHLEBITIS OF SUPERFICIAL VEINS OF LEFT LOWER EXTREMITY: Status: RESOLVED | Noted: 2022-06-09 | Resolved: 2024-08-16

## 2024-08-16 PROBLEM — M75.41 IMPINGEMENT SYNDROME OF RIGHT SHOULDER: Status: RESOLVED | Noted: 2022-08-22 | Resolved: 2024-08-16

## 2024-08-16 PROCEDURE — 99999 PR PBB SHADOW E&M-EST. PATIENT-LVL IV: CPT | Mod: PBBFAC,,, | Performed by: FAMILY MEDICINE

## 2024-08-16 NOTE — PATIENT INSTRUCTIONS
CONSIDER SEASONAL FLU AND COVID VACCINES FALL 2024    OBTAIN THE RSV VACCINE THROUGH THE PHARMACY

## 2024-08-16 NOTE — PROGRESS NOTES
Office Visit    Patient Name: Zenia Canela    : 1955  MRN: [id number]    Subjective:  Zenia is a 69 y.o. female who presents today for:    Annual Exam (Pt has 2 spots she wants looked at. One on her left arm, one on her left leg)    PRIOR PHYSICAL WITH ME 2023   Bariatrics: [place] 7/3/24    Labs prior to office visit 2024 unremarkable with LDL 97 and A1c 5.3. Vit D 46. EGFR normal >60.     Zenia Canela presents today for annual wellness exam and monitoring of chronic conditions that include HTN controlled on HCTZ 12.5 mg daily, ISABELLA stable with use of premarin vaginal & s/p wt loss following gastric sleeve, overweight BMI--> NORMALIZED ON MOUNJARO FOLLOWING BARIATRIC SURGERY, vit D deficiency.   Her mom passed away in  and had some dementia.    Has recovered well from a right knee replacement.  Now with some L knee OA pain but manageable with weight maintenance and periodic cortisone injections.   Has scoliosis- managing with exercises and working with a .   [facility] including PT/core exercises advised.  Had a L varicose Vein Ablation 23 w/ Dr Guillen 2/2 associated pain and is recovering very well. Also previously had RLE EVLT.      She is generally feeling well and without acute complaints.      General lifestyle habits are as follows:    Diet: healthy-- good variety with high protein, drinking plenty of water-- at least 64 oz  Exercise is described as very good-- going to [employer] and working with a  incorporating PT exercises for back pain  Sleep: good, no concerns  Weight: - was down 60+ from prior to her gastric bypass. Was struggling with some with weight gain but that has resolved w/ starting Mounjaro-- had been off for 3 months and was doing well but regained about 10 lbs with recent cruises etc and now back on Mounjaro-- increasing up to 10 mg weekly.  Had gotten down to BMI is low as 23, currently  25.       Immunizations: TDaP 10/29/18, yearly influenza UTD 8/30/23, SHINGRIX 2/2 12/10/21, PREVNAR 20 today 8/16/24, COVID-19 Vaccine completed 2/24/21 W. BOOSTER 11/3/21-SEASONAL FLU/COVID VACCINE ADVISED, RSV VACCINE ADVISED.      Screening Tests: DEXA 7/11/22: mild osteopenia and repeat in 2-3 years, mammogram 8/11/23-- repeat 1 year & ORDERED, Colonoscopy 9/19/23 - repeat 10 yrs, PAP/HPV WNL/(-) 10/14/15- repeat 5 yrs (no longer indicated >65 & no h/o abnormal), HEP C (-) 7/21/2004     Eye/Dental: Eye DUE, Dental UTD- no concerns reported       PAST MEDICAL HISTORY, SURGICAL/SOCIAL/FAMILY HISTORY REVIEWED AS PER CHART, WITH PERTINENT FINDINGS INCLUDED IN HISTORY SECTION OF NOTE.     Current Medications    Medication List with Changes/Refills   Current Medications    CHOLECALCIFEROL, VITAMIN D3, 125 MCG (5,000 UNIT) CAPSULE    TAKE 1 CAPSULE (5,000 UNITS TOTAL) BY MOUTH DAILY WITH BREAKFAST.    HYDROCHLOROTHIAZIDE (HYDRODIURIL) 12.5 MG TAB    TAKE 1 TABLET BY MOUTH EVERY DAY    MULTIVITAMIN CAPSULE    Take 1 capsule by mouth once daily.    TIRZEPATIDE 10 MG/0.5 ML PNIJ    Inject 1 syringe into the skin once weekly.   Discontinued Medications    CLOBETASOL (TEMOVATE) 0.05 % CREAM    Apply topically 2 (two) times daily. Apply to irritated area of skin of leg    TIRZEPATIDE (MOUNJARO) 10 MG/0.5 ML PNIJ    Inject 10 mg into the skin Take once weekly.    TIRZEPATIDE 7.5 MG/0.5 ML PNIJ    Inject 7.5mg into the skin every week       Allergies   Review of patient's allergies indicates:   Allergen Reactions    Phenytoin sodium extended      Other reaction(s): jittery         Review of Systems (Pertinent positives)  Review of Systems   Constitutional:  Negative for fever and unexpected weight change.   HENT:  Negative for sore throat.    Eyes:  Negative for visual disturbance.   Respiratory:  Negative for shortness of breath.    Cardiovascular:  Negative for chest pain and leg swelling.   Gastrointestinal:  Negative for  "constipation and diarrhea.   Endocrine: Negative for heat intolerance.   Genitourinary:  Negative for difficulty urinating, dysuria and vaginal discharge.   Musculoskeletal:  Positive for back pain (Overall significantly improved). Negative for arthralgias.   Skin:  Negative for rash.   Neurological:  Negative for dizziness and light-headedness.   Psychiatric/Behavioral:  Negative for sleep disturbance.        /74 (BP Location: Left arm, Patient Position: Sitting, BP Method: Medium (Manual))   Pulse 70   Temp 98 °F (36.7 °C)   Ht 5' 7" (1.702 m)   Wt 72.8 kg (160 lb 7.9 oz)   LMP  (LMP Unknown)   SpO2 100%   BMI 25.14 kg/m²     Physical Exam  Vitals reviewed.   Constitutional:       General: She is not in acute distress.     Appearance: Normal appearance. She is well-developed.   HENT:      Head: Normocephalic and atraumatic.      Right Ear: Ear canal normal. Tympanic membrane is not erythematous or bulging.      Left Ear: Ear canal normal. Tympanic membrane is not erythematous or bulging.      Nose: Nose normal.      Mouth/Throat:      Mouth: Mucous membranes are moist.      Pharynx: No oropharyngeal exudate.   Eyes:      Extraocular Movements: Extraocular movements intact.      Conjunctiva/sclera: Conjunctivae normal.   Neck:      Thyroid: No thyroid mass or thyromegaly.      Vascular: No carotid bruit.   Cardiovascular:      Rate and Rhythm: Normal rate and regular rhythm.      Pulses:           Dorsalis pedis pulses are 2+ on the right side and 2+ on the left side.      Heart sounds: Normal heart sounds. No murmur heard.  Pulmonary:      Effort: Pulmonary effort is normal. No respiratory distress.      Breath sounds: Normal breath sounds.   Abdominal:      General: Bowel sounds are normal. There is no distension.      Palpations: Abdomen is soft. There is no mass.      Tenderness: There is no abdominal tenderness.   Musculoskeletal:         General: Normal range of motion.      Thoracic back: Spasms " present. Scoliosis present.      Lumbar back: Spasms present. Scoliosis present.      Right lower leg: No edema.      Left lower leg: No edema.   Lymphadenopathy:      Cervical: No cervical adenopathy.   Skin:     General: Skin is warm and dry.      Findings: No rash.   Neurological:      General: No focal deficit present.      Mental Status: She is alert and oriented to person, place, and time.   Psychiatric:         Mood and Affect: Mood normal.         Behavior: Behavior normal.           Assessment/Plan:  Zenia Canela is a 69 y.o. female who presents today for :        ICD-10-CM ICD-9-CM    1. Routine general medical examination at a health care facility  Z00.00 V70.0       2. Overweight (BMI 25.0-29.9)  E66.3 278.02       3. Benign essential hypertension  I10 401.1       4. History of bariatric surgery  Z98.84 V45.86       5. Juvenile idiopathic scoliosis of thoracolumbar region  M41.115 737.30       6. Chronic bilateral low back pain with bilateral sciatica  M54.42 724.2     M54.41 724.3     G89.29 338.29       7. Osteopenia of both hips  M85.851 733.90     M85.852        8. History of total right knee replacement  Z96.651 V43.65       9. Screening mammogram for breast cancer  Z12.31 V76.12 Mammo Digital Screening Bilat      10. Need for RSV immunization  Z29.11 V04.82       11. Need for vaccination with 20-polyvalent pneumococcal conjugate vaccine  Z23 V03.82         ADVISED ON DIET/EXERCISE/SLEEP, ROUTINE EYE/DENTAL EXAMS, AND THE IMPORTANCE OF KEEPING UP WITH APPROPRIATE SCREENING TESTS BASED ON AGE AND RISK FACTORS.  HEALTH MAINTENANCE REVIEWED.  YEARLY MAMMOGRAM ORDERED, PREVNAR 20 GIVEN.  JUST NEEDS RSV VACCINE AND UPDATED SEASONAL FLU AND COVID VACCINES.  NEXT DEXA DUE 07/20/2025, NEXT COLONOSCOPY DUE 2033 BUT REPEAT SCREENING MAY NOT BE INDICATED AT THAT TIME AS SHE WILL BE ALMOST 79 YEARS OLD.    OVERWEIGHT BMI STATUS POST BARIATRIC SURGERY:  Has done well following bariatric surgery and with the  help of weekly GLP 1 injections.  Off and on Mounjaro.  Tolerates the injections well.  Keeping up with a healthy diet and regular exercise routine.  Recent labs all unremarkable in terms of weight related health complications.  A1c and cholesterol at goal.      HYPERTENSION, HISTORY OF LEG SWELLING ASSOCIATED WITH VARICOSE VEINS:  Doing well on hydrochlorothiazide 12.5 mg daily, metabolic panel unremarkable in terms of kidney function, electrolytes, swelling is much improved following her vein procedures and weight loss.  Uses compression stockings as needed.      Patient Instructions   CONSIDER SEASONAL FLU AND COVID VACCINES FALL 2024    OBTAIN THE RSV VACCINE THROUGH THE PHARMACY       Follow up in about 1 year (around 8/16/2025) for to follow up on lab results, return as needed for new concerns.

## 2024-08-25 ENCOUNTER — PATIENT MESSAGE (OUTPATIENT)
Dept: FAMILY MEDICINE | Facility: CLINIC | Age: 69
End: 2024-08-25
Payer: MEDICARE

## 2025-03-17 ENCOUNTER — OFFICE VISIT (OUTPATIENT)
Dept: INTERNAL MEDICINE | Facility: CLINIC | Age: 70
End: 2025-03-17
Payer: MEDICARE

## 2025-03-17 ENCOUNTER — TELEPHONE (OUTPATIENT)
Dept: INTERNAL MEDICINE | Facility: CLINIC | Age: 70
End: 2025-03-17

## 2025-03-17 DIAGNOSIS — D62 ACUTE POSTOPERATIVE ANEMIA DUE TO EXPECTED BLOOD LOSS: ICD-10-CM

## 2025-03-17 DIAGNOSIS — M85.852 OSTEOPENIA OF BOTH HIPS: ICD-10-CM

## 2025-03-17 DIAGNOSIS — Z79.899 MEDICATION MANAGEMENT: ICD-10-CM

## 2025-03-17 DIAGNOSIS — Z96.653 HISTORY OF BILATERAL KNEE REPLACEMENT: ICD-10-CM

## 2025-03-17 DIAGNOSIS — I10 BENIGN ESSENTIAL HYPERTENSION: Primary | ICD-10-CM

## 2025-03-17 DIAGNOSIS — M85.851 OSTEOPENIA OF BOTH HIPS: ICD-10-CM

## 2025-03-17 DIAGNOSIS — Z86.39 HISTORY OF OBESITY: ICD-10-CM

## 2025-03-17 DIAGNOSIS — K59.03 DRUG INDUCED CONSTIPATION: ICD-10-CM

## 2025-03-17 DIAGNOSIS — E55.9 VITAMIN D DEFICIENCY: ICD-10-CM

## 2025-03-17 DIAGNOSIS — I87.8 LOWER EXTREMITY VENOUS STASIS: ICD-10-CM

## 2025-03-17 DIAGNOSIS — G47.00 INSOMNIA, UNSPECIFIED TYPE: ICD-10-CM

## 2025-03-17 DIAGNOSIS — Z98.84 HISTORY OF BARIATRIC SURGERY: ICD-10-CM

## 2025-03-17 DIAGNOSIS — Z23 NEEDS FLU SHOT: ICD-10-CM

## 2025-03-17 PROCEDURE — 98006 SYNCH AUDIO-VIDEO EST MOD 30: CPT | Mod: 95,,, | Performed by: FAMILY MEDICINE

## 2025-03-17 RX ORDER — TOPIRAMATE 100 MG/1
100 TABLET, FILM COATED ORAL
Qty: 30 TABLET | Refills: 11 | Status: SHIPPED | OUTPATIENT
Start: 2025-03-17 | End: 2026-03-17

## 2025-03-17 RX ORDER — AMITRIPTYLINE HYDROCHLORIDE 25 MG/1
25 TABLET, FILM COATED ORAL NIGHTLY PRN
Qty: 30 TABLET | Refills: 3 | Status: SHIPPED | OUTPATIENT
Start: 2025-03-17 | End: 2026-03-17

## 2025-03-17 NOTE — PROGRESS NOTES
Office Visit    Patient Name: Zenia Canela    : 1955  MRN: [id number]    Subjective:  Zenia is a 69 y.o. female who presents today for:    No chief complaint on file.    The patient location is: HER HOME  The chief complaint leading to consultation is: s/p Knee replacement 25 with concerns for subsequent low blood pressure, anemia on labs, postoperative constipation-evaluated in the ER on 2/15/25    Visit type: audiovisual    Face to Face time with patient: START 1115 END 1140  35  minutes of total time spent on the encounter, which includes face to face time and non-face to face time preparing to see the patient (eg, review of tests), Obtaining and/or reviewing separately obtained history, Documenting clinical information in the electronic or other health record, Independently interpreting results (not separately reported) and communicating results to the patient/family/caregiver, or Care coordination (not separately reported).     Each patient to whom he or she provides medical services by telemedicine is:  (1) informed of the relationship between the physician and patient and the respective role of any other health care provider with respect to management of the patient; and (2) notified that he or she may decline to receive medical services by telemedicine and may withdraw from such care at any time.    Notes:     [place] [facility] [place]ORM[place] HEMATOCRIT OF 40.3, NORMAL UA, CMP WITH CONCERN FOR SLIGHT DECLINE IN EGFR TO 55 BUT WITH NORMAL ELECTROLYTES   POSTOPERATIVE CBC DRAWN 2025 WITH SIGNIFICANT DROP IN HEMATOCRIT FROM 40.3--> 29.3    Most recent blood work for me 2024 essentially normal with A1c of 5.3, normal CMP with normal kidney function, normal blood count, vitamin levels including vitamin-D and normal TSH.    69-year-old patient of mine most recently seen by me for physical 2024 and with medical history of  HTN controlled on HCTZ 12.5 mg daily,  ISABELLA stable with use of premarin vaginal & s/p wt loss following gastric sleeve, overweight BMI--> NORMALIZED ON MOUNJARO FOLLOWING BARIATRIC SURGERY, vit D deficiency.  Most recent BMI recorded at 25.8. Has gained a little over 15 lbs since her knee replacement with cessation of weekly GLP 1 injections, needed/wanted to stop well in advance of her surgery.  She has not gone back to her usual fitness routine and is eating slightly more due to lack of appetite suppression on the GLP 1.  The price of GLP 1 has gone up for her and she is wondering about any alternatives I might be able to suggest in addition to her getting more strict with diet and increasing activity as tolerated postop from her knee replacement.    Her post-op PT from knee replacement is going well.  Having some significant swelling of the L knee she just had replaced--has a h/o varicose veins on that side that seem to be exacerbated by the recent surgery.    No associated redness, warmth or pain. Elevates some a few times per day but not consistent. Some improvement. Will fly out to [place] 4/29/25.   Has some compression stockings-- level 2 and she tolerates these will with improvement.     She has been focusing on hydration more-- drinking 2 32 oz cups of water daily-- better about.     Low BPs readings are better and she is less lightheaded. Taking HCTZ 12.5 mg daily-- this is helping with swelling and orthostasis has resolved.                   PAST MEDICAL HISTORY, SURGICAL/SOCIAL/FAMILY HISTORY REVIEWED AS PER CHART, WITH PERTINENT FINDINGS INCLUDED IN HISTORY SECTION OF NOTE.     Current Medications    Medication List with Changes/Refills   New Medications    AMITRIPTYLINE (ELAVIL) 25 MG TABLET    Take 1 tablet (25 mg total) by mouth nightly as needed for Insomnia.    TOPIRAMATE (TOPAMAX) 100 MG TABLET    Take 1 tablet (100 mg total) by mouth after dinner.   Current Medications    CHOLECALCIFEROL, VITAMIN D3, 125 MCG (5,000 UNIT) CAPSULE    TAKE  1 CAPSULE (5,000 UNITS TOTAL) BY MOUTH DAILY WITH BREAKFAST.    HYDROCHLOROTHIAZIDE (HYDRODIURIL) 12.5 MG TAB    TAKE 1 TABLET BY MOUTH EVERY DAY    MULTIVITAMIN CAPSULE    Take 1 capsule by mouth once daily.   Discontinued Medications    MUPIROCIN (BACTROBAN) 2 % OINTMENT    Apply topically 2 (two) times daily for 5 days    TIRZEPATIDE 10 MG/0.5 ML PNIJ    Inject 1 syringe into the skin once weekly.       Allergies   Review of patient's allergies indicates:   Allergen Reactions    Phenytoin sodium extended      Other reaction(s): jittery         Review of Systems (Pertinent positives)  Review of Systems   Constitutional:  Positive for unexpected weight change. Negative for activity change.   HENT:  Negative for hearing loss, rhinorrhea and trouble swallowing.    Eyes:  Negative for discharge and visual disturbance.   Respiratory:  Negative for chest tightness and wheezing.    Cardiovascular:  Negative for chest pain and palpitations.   Gastrointestinal:  Negative for blood in stool, constipation, diarrhea and vomiting.   Endocrine: Negative for polydipsia and polyuria.   Genitourinary:  Negative for difficulty urinating, dysuria, hematuria and menstrual problem.   Musculoskeletal:  Positive for arthralgias and joint swelling. Negative for neck pain.   Neurological:  Negative for weakness and headaches.   Psychiatric/Behavioral:  Positive for sleep disturbance. Negative for confusion and dysphoric mood.        LMP  (LMP Unknown)     Physical Exam  Vitals reviewed.   Constitutional:       General: She is not in acute distress.     Appearance: Normal appearance. She is well-developed.   HENT:      Head: Normocephalic and atraumatic.   Eyes:      Conjunctiva/sclera: Conjunctivae normal.   Pulmonary:      Effort: Pulmonary effort is normal.   Neurological:      Mental Status: She is alert and oriented to person, place, and time.   Psychiatric:         Mood and Affect: Mood normal.         Behavior: Behavior normal.            Assessment/Plan:  Zenia Canela is a 69 y.o. female who presents today for :        ICD-10-CM ICD-9-CM    1. Benign essential hypertension  I10 401.1 CBC Auto Differential      Basic Metabolic Panel      2. Acute postoperative anemia due to expected blood loss  D62 285.1 CBC Auto Differential      Basic Metabolic Panel      3. Vitamin D deficiency  E55.9 268.9       4. History of obesity  Z86.39 V12.29 topiramate (TOPAMAX) 100 MG tablet      5. History of bariatric surgery  Z98.84 V45.86 topiramate (TOPAMAX) 100 MG tablet      6. Lower extremity venous stasis  I87.8 459.81       7. History of bilateral knee replacement  Z96.653 V43.65       8. Medication management  Z79.899 V58.69       9. Drug induced constipation  K59.03 564.09      E980.5     resolved with stopping pain meds post op-- was temporarily on opioids s/p L knee replacement      10. Insomnia, unspecified type  G47.00 780.52 amitriptyline (ELAVIL) 25 MG tablet      11. Osteopenia of both hips  M85.851 733.90     M85.852      next DEXA due 7/2025      12. Needs flu shot  Z23 V04.81         MAMMOGRAM AND BONE DENSITY DUE AUGUST 2025-WILL ORDER WHEN SHE FOLLOWS UP FOR HER PHYSICAL  IMMUNIZATIONS UP-TO-DATE EXCEPT WILL OBTAIN SEASONAL FLU SHOT AND HAS DECLINED UPDATED COVID VACCINES  COLONOSCOPY 09/1923.      HYPERTENSION:  Generally well-controlled on HCTZ 12.5 mg daily but had hypotension that was symptomatic following her surgery.  Was not hydrating as much and had some significant postoperative anemia.  Symptomatically she has improved even with continuing to hydrochlorothiazide and also with increased emphasis on hydration.  Will recheck kidney function and blood counts with labs.      ACUTE BLOOD LOSS ANEMIA:  Had a fairly significant drop in hematocrit by about 11 points with recent left knee replacement.  Will recheck CBC with labs.  Prior ferritin and vitamin levels were normal so hopefully her body has been able to naturally replenish  her blood counts.  Symptomatically she is feeling better.      HISTORY OF OBESITY, STATUS POST BARIATRIC SURGERY:  Has lost a very significant amount of weight following bariatric surgery and with the help of GLP 1 injections.  Has been off of GLP 1 injections due to recent knee replacement.  Hoping to remain off of them as insurance is no longer covering.  Will try Topamax  mg nightly after dinner for a long term appetite suppression she will let me know how she tolerates it.      BILATERAL LOWER EXTREMITY VENOUS STASIS, LEFT GREATER THAN RIGHT, RECENT LEFT KNEE REPLACEMENT:  Has now had both knees replaced, most recently left knee replaced with some subsequent increase in her dependent edema.  Continue elevation, compression, HCTZ 12.5 mg daily.  She does report some slow but steady improvement.      INSOMNIA:  Would like something non addictive/non habit-forming especially in advance of her upcoming trip to [place].  Will try amitriptyline 12.5-25 mg nightly p.r.n..  Will let me know if this exacerbates her constipation too much.      A total of 40  minutes was spent on this encounter that included explaining differentials, symptom counseling, review of recent results, and next steps of diagnosis and management plan, along with direct documentation of the encounter.      There are no Patient Instructions on file for this visit.      Follow up in about 5 months (around 8/17/2025) for to follow up on lab results, return as needed for new concerns.

## 2025-03-17 NOTE — TELEPHONE ENCOUNTER
----- Message from Vianney Downs MD sent at 3/17/2025 11:40 AM CDT -----  Regarding: VV follow up  Please schedule for the nonfasting BMP and CBC I entered.These should be done some time in the coming week.  Also, I would like to schedule her towards the end of August for her yearly physical.  I will wait to enter the lab orders until I get the results of the ones above.  Thanks!

## 2025-03-22 ENCOUNTER — RESULTS FOLLOW-UP (OUTPATIENT)
Dept: INTERNAL MEDICINE | Facility: CLINIC | Age: 70
End: 2025-03-22

## 2025-06-12 DIAGNOSIS — G47.00 INSOMNIA, UNSPECIFIED TYPE: ICD-10-CM

## 2025-06-12 RX ORDER — AMITRIPTYLINE HYDROCHLORIDE 25 MG/1
25 TABLET, FILM COATED ORAL NIGHTLY
Qty: 90 TABLET | Refills: 4 | Status: SHIPPED | OUTPATIENT
Start: 2025-06-12

## 2025-06-12 NOTE — TELEPHONE ENCOUNTER
Care Due:                  Date            Visit Type   Department     Provider  --------------------------------------------------------------------------------                                ESTABLISHED                              PATIENT -    [facility] INTERNAL  Last Visit: 03-      VIRTUAL      MEDICINE       Vianney Downs                               -                              PRIMARY      [facility] INTER[facility]  Next Visit: 08-      CARE ([facility])   MEDICINE       Vianney Downs                                                            Last  Test          Frequency    Reason                     Performed    Due Date  --------------------------------------------------------------------------------    Vitamin D...  12 months..  cholecalciferol,.........  08- 08-    Health [facility] Embedded Care Due Messages. Reference number: [id number].   6/12/2025 12:22:47 AM CDT

## 2025-06-12 NOTE — TELEPHONE ENCOUNTER
Refill Routing Note   Medication(s) are not appropriate for processing by [facility] for the following reason(s):        New or recently adjusted medication    [facility] action(s):  Defer     Requires labs : Yes             Appointments  past 12m or future 3m with PCP    Date Provider   Last Visit   3/17/2025 Vianney Downs MD   Next Visit   8/19/2025 Vianney Downs MD   ED visits in past 90 days: 0        Note composed:5:23 AM 06/12/2025

## 2025-07-17 DIAGNOSIS — Z78.0 MENOPAUSE: ICD-10-CM

## 2025-07-21 ENCOUNTER — HOSPITAL ENCOUNTER (OUTPATIENT)
Dept: RADIOLOGY | Facility: HOSPITAL | Age: 70
Discharge: HOME | End: 2025-07-21
Attending: FAMILY MEDICINE
Payer: MEDICARE

## 2025-07-21 DIAGNOSIS — Z78.0 MENOPAUSE: ICD-10-CM

## 2025-07-21 PROCEDURE — 77080 DXA BONE DENSITY AXIAL: CPT | Mod: TC

## 2025-07-21 PROCEDURE — 77080 DXA BONE DENSITY AXIAL: CPT | Mod: 26,,, | Performed by: RADIOLOGY

## 2025-08-19 ENCOUNTER — OFFICE VISIT (OUTPATIENT)
Dept: INTERNAL MEDICINE | Facility: CLINIC | Age: 70
End: 2025-08-19
Payer: MEDICARE

## 2025-08-19 VITALS
WEIGHT: 165.81 LBS | OXYGEN SATURATION: 95 % | HEIGHT: 67 IN | SYSTOLIC BLOOD PRESSURE: 118 MMHG | TEMPERATURE: 98 F | HEART RATE: 77 BPM | DIASTOLIC BLOOD PRESSURE: 76 MMHG | BODY MASS INDEX: 26.02 KG/M2

## 2025-08-19 DIAGNOSIS — M41.115 JUVENILE IDIOPATHIC SCOLIOSIS OF THORACOLUMBAR REGION: ICD-10-CM

## 2025-08-19 DIAGNOSIS — Z12.31 SCREENING MAMMOGRAM FOR BREAST CANCER: ICD-10-CM

## 2025-08-19 DIAGNOSIS — Z96.653 HISTORY OF BILATERAL KNEE REPLACEMENT: ICD-10-CM

## 2025-08-19 DIAGNOSIS — I87.8 LOWER EXTREMITY VENOUS STASIS: ICD-10-CM

## 2025-08-19 DIAGNOSIS — Z00.00 ROUTINE GENERAL MEDICAL EXAMINATION AT A HEALTH CARE FACILITY: Primary | ICD-10-CM

## 2025-08-19 DIAGNOSIS — Z79.899 MEDICATION MANAGEMENT: ICD-10-CM

## 2025-08-19 DIAGNOSIS — E55.9 VITAMIN D DEFICIENCY: ICD-10-CM

## 2025-08-19 DIAGNOSIS — Z29.11 NEED FOR RSV IMMUNIZATION: ICD-10-CM

## 2025-08-19 DIAGNOSIS — R79.9 ABNORMAL FINDING OF BLOOD CHEMISTRY, UNSPECIFIED: ICD-10-CM

## 2025-08-19 DIAGNOSIS — M85.851 OSTEOPENIA OF BOTH HIPS: ICD-10-CM

## 2025-08-19 DIAGNOSIS — Z12.83 SKIN CANCER SCREENING: ICD-10-CM

## 2025-08-19 DIAGNOSIS — Z98.84 HISTORY OF BARIATRIC SURGERY: ICD-10-CM

## 2025-08-19 DIAGNOSIS — I10 BENIGN ESSENTIAL HYPERTENSION: ICD-10-CM

## 2025-08-19 DIAGNOSIS — E66.3 OVERWEIGHT (BMI 25.0-29.9): ICD-10-CM

## 2025-08-19 DIAGNOSIS — M85.852 OSTEOPENIA OF BOTH HIPS: ICD-10-CM

## 2025-08-19 PROCEDURE — 99999 PR PBB SHADOW E&M-EST. PATIENT-LVL V: CPT | Mod: PBBFAC,,, | Performed by: FAMILY MEDICINE

## 2025-08-19 PROCEDURE — 3074F SYST BP LT 130 MM HG: CPT | Mod: CPTII,S$GLB,, | Performed by: FAMILY MEDICINE

## 2025-08-19 PROCEDURE — 1101F PT FALLS ASSESS-DOCD LE1/YR: CPT | Mod: CPTII,S$GLB,, | Performed by: FAMILY MEDICINE

## 2025-08-19 PROCEDURE — 3078F DIAST BP <80 MM HG: CPT | Mod: CPTII,S$GLB,, | Performed by: FAMILY MEDICINE

## 2025-08-19 PROCEDURE — 3288F FALL RISK ASSESSMENT DOCD: CPT | Mod: CPTII,S$GLB,, | Performed by: FAMILY MEDICINE

## 2025-08-19 PROCEDURE — 3008F BODY MASS INDEX DOCD: CPT | Mod: CPTII,S$GLB,, | Performed by: FAMILY MEDICINE

## 2025-08-19 PROCEDURE — 1160F RVW MEDS BY RX/DR IN RCRD: CPT | Mod: CPTII,S$GLB,, | Performed by: FAMILY MEDICINE

## 2025-08-19 PROCEDURE — 1126F AMNT PAIN NOTED NONE PRSNT: CPT | Mod: CPTII,S$GLB,, | Performed by: FAMILY MEDICINE

## 2025-08-19 PROCEDURE — 99397 PER PM REEVAL EST PAT 65+ YR: CPT | Mod: S$GLB,,, | Performed by: FAMILY MEDICINE

## 2025-08-19 PROCEDURE — 1159F MED LIST DOCD IN RCRD: CPT | Mod: CPTII,S$GLB,, | Performed by: FAMILY MEDICINE

## 2025-08-19 RX ORDER — HYDROCHLOROTHIAZIDE 12.5 MG/1
12.5 TABLET ORAL DAILY
Qty: 90 TABLET | Refills: 4 | Status: SHIPPED | OUTPATIENT
Start: 2025-08-19

## 2025-08-24 ENCOUNTER — TELEPHONE (OUTPATIENT)
Dept: INTERNAL MEDICINE | Facility: CLINIC | Age: 70
End: 2025-08-24
Payer: MEDICARE

## 2025-08-24 DIAGNOSIS — N28.9 RENAL INSUFFICIENCY: Primary | ICD-10-CM

## (undated) DEVICE — COVER BAND BAG 40 X 40

## (undated) DEVICE — COVER PROBE US 5.5X58L NON LTX

## (undated) DEVICE — KIT MICRO INTRODUCER 4F .018IN

## (undated) DEVICE — PACK ADMIN VARITHENA UNIVERSAL

## (undated) DEVICE — PACK RF ABLATION PROCEDURE